# Patient Record
Sex: FEMALE | Race: WHITE | HISPANIC OR LATINO | Employment: UNEMPLOYED | URBAN - METROPOLITAN AREA
[De-identification: names, ages, dates, MRNs, and addresses within clinical notes are randomized per-mention and may not be internally consistent; named-entity substitution may affect disease eponyms.]

---
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
## 2021-02-05 ENCOUNTER — HOSPITAL ENCOUNTER (OUTPATIENT)
Facility: HOSPITAL | Age: 21
Discharge: HOME | End: 2021-02-05
Attending: ADVANCED PRACTICE MIDWIFE | Admitting: ADVANCED PRACTICE MIDWIFE

## 2021-02-05 ENCOUNTER — HOSPITAL ENCOUNTER (OUTPATIENT)
Facility: HOSPITAL | Age: 21
End: 2021-02-05
Attending: OBSTETRICS & GYNECOLOGY | Admitting: OBSTETRICS & GYNECOLOGY

## 2021-02-05 ENCOUNTER — HOSPITAL ENCOUNTER (EMERGENCY)
Facility: HOSPITAL | Age: 21
Discharge: STILL ADMITTED OR ADMINISTRATIVE | End: 2021-02-05

## 2021-02-05 VITALS
SYSTOLIC BLOOD PRESSURE: 123 MMHG | DIASTOLIC BLOOD PRESSURE: 63 MMHG | BODY MASS INDEX: 30.9 KG/M2 | WEIGHT: 181 LBS | RESPIRATION RATE: 16 BRPM | HEIGHT: 64 IN | OXYGEN SATURATION: 100 % | TEMPERATURE: 98.3 F | HEART RATE: 102 BPM

## 2021-02-05 LAB
BACTERIA UR QL AUTO: ABNORMAL /HPF
BILIRUB UR QL STRIP: NEGATIVE
CLARITY UR: ABNORMAL
COLOR UR: YELLOW
GLUCOSE UR STRIP-MCNC: NEGATIVE MG/DL
HGB UR QL STRIP.AUTO: NEGATIVE
HYALINE CASTS UR QL AUTO: ABNORMAL /LPF
KETONES UR QL STRIP: NEGATIVE
LEUKOCYTE ESTERASE UR QL STRIP.AUTO: ABNORMAL
NITRITE UR QL STRIP: NEGATIVE
PH UR STRIP.AUTO: 7.5 [PH] (ref 5–8)
PROT UR QL STRIP: NEGATIVE
RBC # UR: ABNORMAL /HPF
REF LAB TEST METHOD: ABNORMAL
SP GR UR STRIP: 1.01 (ref 1–1.03)
SQUAMOUS #/AREA URNS HPF: ABNORMAL /HPF
UROBILINOGEN UR QL STRIP: ABNORMAL
WBC UR QL AUTO: ABNORMAL /HPF

## 2021-02-05 PROCEDURE — 59025 FETAL NON-STRESS TEST: CPT | Performed by: OBSTETRICS & GYNECOLOGY

## 2021-02-05 PROCEDURE — 96372 THER/PROPH/DIAG INJ SC/IM: CPT

## 2021-02-05 PROCEDURE — 81001 URINALYSIS AUTO W/SCOPE: CPT | Performed by: OBSTETRICS & GYNECOLOGY

## 2021-02-05 PROCEDURE — 99204 OFFICE O/P NEW MOD 45 MIN: CPT | Performed by: OBSTETRICS & GYNECOLOGY

## 2021-02-05 PROCEDURE — G0463 HOSPITAL OUTPT CLINIC VISIT: HCPCS

## 2021-02-05 PROCEDURE — 25010000002 CEFTRIAXONE PER 250 MG: Performed by: OBSTETRICS & GYNECOLOGY

## 2021-02-05 RX ORDER — AMOXICILLIN 875 MG/1
875 TABLET, COATED ORAL 2 TIMES DAILY
COMMUNITY
Start: 2021-02-04 | End: 2021-02-05 | Stop reason: HOSPADM

## 2021-02-05 RX ORDER — NITROFURANTOIN 25; 75 MG/1; MG/1
100 CAPSULE ORAL 2 TIMES DAILY
COMMUNITY
Start: 2021-01-20 | End: 2021-02-05 | Stop reason: HOSPADM

## 2021-02-05 RX ADMIN — LIDOCAINE HYDROCHLORIDE 1 G: 10 INJECTION, SOLUTION EPIDURAL; INFILTRATION; INTRACAUDAL; PERINEURAL at 11:30

## 2021-02-05 NOTE — H&P
[facility]  Obstetric History and Physical    Chief Complaint   Patient presents with   • Back Pain     uti  tx abx; 2 uti, abx       Subjective     Patient is a 20 y.o. female  currently at 26w3d, who presents with complaints of low back pain.  She has recently been treated for UTI with nitrofurantoin.  She states she was seen at urgent treatment yesterday and given amoxicillin.  Review of her urine today does confirm 6-12 white blood cells per high-power field but there are also 3-6 epithelial cells noted.  Patient denies contractions, vaginal bleeding or leakage of fluid.    Her prenatal care is reportedly benign. Her previous obstetric/gynecological history is noncontributory.    The following portions of the patients history were reviewed and updated as appropriate: current medications, allergies, past medical history, past surgical history, past family history, past social history and problem list .        Prenatal Information:   Maternal Prenatal Labs  Blood Type No results found for: ABO   Rh Status No results found for: RH   Antibody Screen No results found for: ABSCRN   Gonnorhea No results found for: GCCX   Chlamydia No results found for: CLAMYDCU   RPR No results found for: RPR   Syphilis Antibody No results found for: SYPHILIS   Rubella No results found for: RUBELLAIGGIN   Hepatitis B Surface Antigen No results found for: HEPBSAG   HIV-1 Antibody No results found for: LABHIV1   Hepatitis C Antibody No results found for: HEPCAB   Rapid Urin Drug Screen No results found for: AMPMETHU, BARBITSCNUR, LABBENZSCN, LABMETHSCN, LABOPIASCN, THCURSCR, COCAINEUR, AMPHETSCREEN, PROPOXSCN, BUPRENORSCNU, METAMPSCNUR, OXYCODONESCN, TRICYCLICSCN   Group B Strep Culture No results found for: GBSANTIGEN           External Prenatal Results     Pregnancy Outside Results - Transcribed From Office Records - See Scanned Records For Details     Test Value Date Time    Hgb 11.7 g/dL 10/30/20 1105    Hct 35.1 %  10/30/20 1105    ABO A  20 1656    Rh Positive  20 1656    Antibody Screen Negative  20 1656    Glucose Fasting GTT       Glucose Tolerance Test 1 hour       Glucose Tolerance Test 3 hour       Gonorrhea (discrete)       Chlamydia (discrete)       RPR Non-Reactive  10/30/20 1105    VDRL       Syphilis Antibody       Rubella Positive  10/30/20 1105    HBsAg Non-Reactive  10/30/20 1105    Herpes Simplex Virus PCR       Herpes Simplex VIrus Culture       HIV Non-Reactive  10/30/20 1105    Hep C RNA Quant PCR       Hep C Antibody Non-Reactive  10/30/20 1105    AFP       Group B Strep       GBS Susceptibility to Clindamycin       GBS Susceptibility to Erythromycin       Fetal Fibronectin       Genetic Testing, Maternal Blood             Drug Screening     Test Value Date Time    Urine Drug Screen       Amphetamine Screen Negative  10/30/20 1105    Barbiturate Screen Negative  10/30/20 1105    Benzodiazepine Screen Negative  10/30/20 1105    Methadone Screen Negative  10/30/20 1105    Phencyclidine Screen Negative  10/30/20 1105    Opiates Screen Negative  10/30/20 1105    THC Screen Negative  10/30/20 1105    Cocaine Screen       Propoxyphene Screen Negative  10/30/20 1105    Buprenorphine Screen Negative  10/30/20 1105    Methamphetamine Screen       Oxycodone Screen Negative  10/30/20 1105    Tricyclic Antidepressants Screen Negative  10/30/20 1105                  Past OB History:       OB History    Para Term  AB Living   1 0 0 0 0 0   SAB TAB Ectopic Molar Multiple Live Births   0 0 0 0 0 0      # Outcome Date GA Lbr Abner/2nd Weight Sex Delivery Anes PTL Lv   1 Current                Past Medical History:  Past Medical History:   Diagnosis Date   • Urinary tract infection       Past Surgical History History reviewed. No pertinent surgical history.   Family History: Family History   Problem Relation Age of Onset   • No Known Problems Mother    • No Known Problems Father       Social  History:  reports that she has never smoked. She has never used smokeless tobacco.   reports no history of alcohol use.   reports no history of drug use.   Allergies: Patient has no known allergies.  Current Medications:          No current facility-administered medications on file prior to encounter.      Current Outpatient Medications on File Prior to Encounter   Medication Sig Dispense Refill   • amoxicillin (AMOXIL) 875 MG tablet Take 875 mg by mouth 2 (Two) Times a Day.     • nitrofurantoin, macrocrystal-monohydrate, (MACROBID) 100 MG capsule Take 100 mg by mouth 2 (Two) Times a Day.         General ROS: Pertinent items are noted in HPI, all other systems reviewed and negative    Objective       Vital Signs Range for the last 24 hours  Temperature: Temp:  [98.3 °F (36.8 °C)] 98.3 °F (36.8 °C)   Temp Source: Temp src: Oral   BP: BP: (123)/(63) 123/63   Pulse: Heart Rate:  [102] 102   Respirations: Resp:  [16] 16   SPO2: SpO2:  [100 %] 100 %   O2 Amount (l/min):     O2 Devices     Weight: Weight:  [82.1 kg (181 lb)] 82.1 kg (181 lb)     Physical Examination: General appearance - alert, well appearing, and in no distress, oriented to person, place, and time and normal appearing weight  Mental status - alert, oriented to person, place, and time, normal mood, behavior, speech, dress, motor activity, and thought processes  Eyes - pupils equal and reactive, extraocular eye movements intact, sclera anicteric  Neck - supple, no significant adenopathy, thyroid exam: thyroid is normal in size without nodules or tenderness  Chest - clear to auscultation, no wheezes, rales or rhonchi, symmetric air entry  Heart - normal rate, regular rhythm, normal S1, S2, no murmurs, rubs, clicks or gallops  Abdomen-soft, nontender, nondistended, no masses or organomegaly   Abdomen, Non-Tender  Pelvic - VULVA: normal appearing vulva with no masses, tenderness or lesions, CERVIX: Closed/thick/long  Neurological - alert, oriented,  "normal speech, no focal findings or movement disorder noted, DTR's normal and symmetric  Extremities - no pedal edema noted    Fetal Heart Rate Assessment  Indication: Back pain   Start Time: 0847                end Time: 1011   NST Results: NST reactive.  Baseline fetal heart rate 130-140 bpm.  Average variability with multiple 15 x 15 accelerations.  No decelerations.  No contractions        Laboratory Results:   No results found for: ALKPHOS, ALT, AST, CREATININE, BILITOT, LDH, URICACID    No results found for: WBC, RBC, HGB, HCT, MCV, MCH, MCHC, RDW, RDWSD, MPV, PLT, NEUTRORELPCT, LYMPHORELPCT, MONORELPCT, EOSRELPCT, BASORELPCT, AUTOIGPER, NEUTROABS, LYMPHSABS, MONOSABS, EOSABS, BASOSABS, AUTOIGNUM, NRBC          Brief Urine Lab Results  (Last result in the past 365 days)      Color   Clarity   Blood   Leuk Est   Nitrite   Protein   CREAT   Urine HCG        21 0907 Yellow Turbid Negative Trace Negative Negative                 Radiology Review: No new studies  Other Studies: Equivocal UA.    Assessment/Plan       * No active hospital problems. *        Assessment:  1. Back pain in pregnancy.  2. UTI in pregnancy    Plan:  1. Rocephin 1 g IM.  2. Reviewed signs/symptoms of  labor.  3. Discharged home.  4. Keep regularly scheduled OB office visit.     Total time spent today with Shirley  was 20-29 minutes (level 3).  Of this time, > 50% was spent face-to-face time coordinating care, answering her questions and counseling regarding pathophysiology of her presenting problem along with plans for any diagnostic work-up and treatment.        Saturnino Ramos \"Angi\" KWAME Kearney MD  2021  10:22 EST    "

## 2021-02-10 ENCOUNTER — LAB (OUTPATIENT)
Dept: LAB | Facility: HOSPITAL | Age: 21
End: 2021-02-10

## 2021-02-10 ENCOUNTER — TRANSCRIBE ORDERS (OUTPATIENT)
Dept: LAB | Facility: HOSPITAL | Age: 21
End: 2021-02-10

## 2021-02-10 DIAGNOSIS — Z34.83 PRENATAL CARE, SUBSEQUENT PREGNANCY, THIRD TRIMESTER: Primary | ICD-10-CM

## 2021-02-10 DIAGNOSIS — Z3A.27 27 WEEKS GESTATION OF PREGNANCY: ICD-10-CM

## 2021-02-10 DIAGNOSIS — Z34.83 PRENATAL CARE, SUBSEQUENT PREGNANCY, THIRD TRIMESTER: ICD-10-CM

## 2021-02-10 LAB
BLD GP AB SCN SERPL QL: NEGATIVE
DEPRECATED RDW RBC AUTO: 39.7 FL (ref 37–54)
ERYTHROCYTE [DISTWIDTH] IN BLOOD BY AUTOMATED COUNT: 12.7 % (ref 12.3–15.4)
GLUCOSE 1H P 100 G GLC PO SERPL-MCNC: 121 MG/DL (ref 65–140)
HCT VFR BLD AUTO: 33.6 % (ref 34–46.6)
HGB BLD-MCNC: 10.5 G/DL (ref 12–15.9)
MCH RBC QN AUTO: 26.8 PG (ref 26.6–33)
MCHC RBC AUTO-ENTMCNC: 31.3 G/DL (ref 31.5–35.7)
MCV RBC AUTO: 85.7 FL (ref 79–97)
PLATELET # BLD AUTO: 328 10*3/MM3 (ref 140–450)
PMV BLD AUTO: 9.8 FL (ref 6–12)
RBC # BLD AUTO: 3.92 10*6/MM3 (ref 3.77–5.28)
WBC # BLD AUTO: 13.49 10*3/MM3 (ref 3.4–10.8)

## 2021-02-10 PROCEDURE — 86850 RBC ANTIBODY SCREEN: CPT

## 2021-02-10 PROCEDURE — 85027 COMPLETE CBC AUTOMATED: CPT

## 2021-02-10 PROCEDURE — 82950 GLUCOSE TEST: CPT

## 2021-02-10 PROCEDURE — 36415 COLL VENOUS BLD VENIPUNCTURE: CPT

## 2021-03-09 ENCOUNTER — HOSPITAL ENCOUNTER (OUTPATIENT)
Facility: HOSPITAL | Age: 21
Setting detail: OBSERVATION
Discharge: HOME | End: 2021-03-10
Attending: ADVANCED PRACTICE MIDWIFE | Admitting: OBSTETRICS & GYNECOLOGY

## 2021-03-09 LAB
DEPRECATED RDW RBC AUTO: 40.8 FL (ref 37–54)
ERYTHROCYTE [DISTWIDTH] IN BLOOD BY AUTOMATED COUNT: 13.8 % (ref 12.3–15.4)
HCT VFR BLD AUTO: 29.9 % (ref 34–46.6)
HGB BLD-MCNC: 9.5 G/DL (ref 12–15.9)
MCH RBC QN AUTO: 26.5 PG (ref 26.6–33)
MCHC RBC AUTO-ENTMCNC: 31.8 G/DL (ref 31.5–35.7)
MCV RBC AUTO: 83.3 FL (ref 79–97)
PLATELET # BLD AUTO: 289 10*3/MM3 (ref 140–450)
PMV BLD AUTO: 9.6 FL (ref 6–12)
RBC # BLD AUTO: 3.59 10*6/MM3 (ref 3.77–5.28)
WBC # BLD AUTO: 9.88 10*3/MM3 (ref 3.4–10.8)

## 2021-03-09 PROCEDURE — 36415 COLL VENOUS BLD VENIPUNCTURE: CPT | Performed by: OBSTETRICS & GYNECOLOGY

## 2021-03-09 PROCEDURE — 87086 URINE CULTURE/COLONY COUNT: CPT | Performed by: OBSTETRICS & GYNECOLOGY

## 2021-03-09 PROCEDURE — 85027 COMPLETE CBC AUTOMATED: CPT | Performed by: OBSTETRICS & GYNECOLOGY

## 2021-03-09 PROCEDURE — G0378 HOSPITAL OBSERVATION PER HR: HCPCS

## 2021-03-09 PROCEDURE — 81001 URINALYSIS AUTO W/SCOPE: CPT | Performed by: OBSTETRICS & GYNECOLOGY

## 2021-03-09 PROCEDURE — 80053 COMPREHEN METABOLIC PANEL: CPT | Performed by: OBSTETRICS & GYNECOLOGY

## 2021-03-09 RX ORDER — D-METHORPHAN/PE/ACETAMINOPHEN 10-5-325MG
2 CAPSULE ORAL
COMMUNITY

## 2021-03-09 RX ORDER — FERROUS SULFATE 325(65) MG
325 TABLET ORAL
COMMUNITY

## 2021-03-10 ENCOUNTER — HOSPITAL ENCOUNTER (OUTPATIENT)
Facility: HOSPITAL | Age: 21
End: 2021-03-10
Attending: OBSTETRICS & GYNECOLOGY | Admitting: OBSTETRICS & GYNECOLOGY

## 2021-03-10 VITALS
WEIGHT: 189 LBS | TEMPERATURE: 98.8 F | SYSTOLIC BLOOD PRESSURE: 129 MMHG | DIASTOLIC BLOOD PRESSURE: 78 MMHG | HEIGHT: 64 IN | RESPIRATION RATE: 16 BRPM | BODY MASS INDEX: 32.27 KG/M2

## 2021-03-10 PROBLEM — O26.813 FATIGUE DURING PREGNANCY IN THIRD TRIMESTER: Status: ACTIVE | Noted: 2021-03-10

## 2021-03-10 LAB
ALBUMIN SERPL-MCNC: 3.3 G/DL (ref 3.5–5.2)
ALBUMIN/GLOB SERPL: 1.2 G/DL
ALP SERPL-CCNC: 88 U/L (ref 39–117)
ALT SERPL W P-5'-P-CCNC: 11 U/L (ref 1–33)
AMORPH URATE CRY URNS QL MICRO: ABNORMAL /HPF
ANION GAP SERPL CALCULATED.3IONS-SCNC: 11 MMOL/L (ref 5–15)
AST SERPL-CCNC: 17 U/L (ref 1–32)
BACTERIA UR QL AUTO: ABNORMAL /HPF
BACTERIA UR QL AUTO: ABNORMAL /HPF
BILIRUB SERPL-MCNC: <0.2 MG/DL (ref 0–1.2)
BILIRUB UR QL STRIP: NEGATIVE
BILIRUB UR QL STRIP: NEGATIVE
BUN SERPL-MCNC: 6 MG/DL (ref 6–20)
BUN/CREAT SERPL: 11.8 (ref 7–25)
CALCIUM SPEC-SCNC: 8.6 MG/DL (ref 8.6–10.5)
CHLORIDE SERPL-SCNC: 106 MMOL/L (ref 98–107)
CLARITY UR: ABNORMAL
CLARITY UR: ABNORMAL
CO2 SERPL-SCNC: 22 MMOL/L (ref 22–29)
COD CRY URNS QL: ABNORMAL /HPF
COLOR UR: YELLOW
COLOR UR: YELLOW
CREAT SERPL-MCNC: 0.51 MG/DL (ref 0.57–1)
GFR SERPL CREATININE-BSD FRML MDRD: >150 ML/MIN/1.73
GFR SERPL CREATININE-BSD FRML MDRD: >150 ML/MIN/1.73
GLOBULIN UR ELPH-MCNC: 2.8 GM/DL
GLUCOSE SERPL-MCNC: 174 MG/DL (ref 65–99)
GLUCOSE UR STRIP-MCNC: ABNORMAL MG/DL
GLUCOSE UR STRIP-MCNC: ABNORMAL MG/DL
HGB UR QL STRIP.AUTO: NEGATIVE
HGB UR QL STRIP.AUTO: NEGATIVE
HYALINE CASTS UR QL AUTO: ABNORMAL /LPF
HYALINE CASTS UR QL AUTO: ABNORMAL /LPF
KETONES UR QL STRIP: ABNORMAL
KETONES UR QL STRIP: NEGATIVE
LEUKOCYTE ESTERASE UR QL STRIP.AUTO: ABNORMAL
LEUKOCYTE ESTERASE UR QL STRIP.AUTO: NEGATIVE
NITRITE UR QL STRIP: NEGATIVE
NITRITE UR QL STRIP: NEGATIVE
PH UR STRIP.AUTO: 6.5 [PH] (ref 5–8)
PH UR STRIP.AUTO: 6.5 [PH] (ref 5–8)
POTASSIUM SERPL-SCNC: 3.4 MMOL/L (ref 3.5–5.2)
PROT SERPL-MCNC: 6.1 G/DL (ref 6–8.5)
PROT UR QL STRIP: ABNORMAL
PROT UR QL STRIP: ABNORMAL
RBC # UR: ABNORMAL /HPF
RBC # UR: ABNORMAL /HPF
REF LAB TEST METHOD: ABNORMAL
REF LAB TEST METHOD: ABNORMAL
RENAL EPI CELLS #/AREA URNS HPF: ABNORMAL /HPF
RENAL EPI CELLS #/AREA URNS HPF: ABNORMAL /HPF
SODIUM SERPL-SCNC: 139 MMOL/L (ref 136–145)
SP GR UR STRIP: 1.02 (ref 1–1.03)
SP GR UR STRIP: 1.03 (ref 1–1.03)
SQUAMOUS #/AREA URNS HPF: ABNORMAL /HPF
SQUAMOUS #/AREA URNS HPF: ABNORMAL /HPF
TRANS CELLS #/AREA URNS HPF: ABNORMAL /HPF
UROBILINOGEN UR QL STRIP: ABNORMAL
UROBILINOGEN UR QL STRIP: ABNORMAL
WBC UR QL AUTO: ABNORMAL /HPF
WBC UR QL AUTO: ABNORMAL /HPF

## 2021-03-10 PROCEDURE — 99219 PR INITIAL OBSERVATION CARE/DAY 50 MINUTES: CPT | Performed by: OBSTETRICS & GYNECOLOGY

## 2021-03-10 PROCEDURE — G0378 HOSPITAL OBSERVATION PER HR: HCPCS

## 2021-03-10 PROCEDURE — 59025 FETAL NON-STRESS TEST: CPT | Performed by: OBSTETRICS & GYNECOLOGY

## 2021-03-10 PROCEDURE — 87086 URINE CULTURE/COLONY COUNT: CPT | Performed by: OBSTETRICS & GYNECOLOGY

## 2021-03-10 PROCEDURE — 81001 URINALYSIS AUTO W/SCOPE: CPT | Performed by: OBSTETRICS & GYNECOLOGY

## 2021-03-10 PROCEDURE — P9612 CATHETERIZE FOR URINE SPEC: HCPCS

## 2021-03-10 PROCEDURE — 59025 FETAL NON-STRESS TEST: CPT

## 2021-03-10 NOTE — H&P
"[place]  2000  [id number]  [id number]    CC: feels weak  HPI:  Patient is 20 y.o. female   currently at 31w1d presents with c/o feeling weak and exhausted.  Today only.  Pt also with c/o low back pain.  Good FM.  Pt denies fever, bleeding, SROM, or contractions.  Pt further denies N, V, or D.  Appetite is poor, but she is eating and drinking.  Pt says she has gained ~20 pounds so far this preg.     PMH:  Current meds: flinstones, OTC Fe  Illnesses: asthma (resolved), anxiety  Surgeries: none  Allergies: NKDA    Past OB History:       OB History    Para Term  AB Living   1 0 0 0 0 0   SAB TAB Ectopic Molar Multiple Live Births   0 0 0 0 0 0      # Outcome Date GA Lbr Abner/2nd Weight Sex Delivery Anes PTL Lv   1 Current                     SH: tob neg , EtOH neg, drugs neg  FH: heart dz neg , diabetes pos , cancer pos    General ROS: fatigue.   All other systems reviewed and are negative.      Physical Examination: General appearance - alert, well appearing, and in no distress  Vital signs - /78   Temp 98.8 °F (37.1 °C) (Oral)   Resp 16   Ht 162.6 cm (64\")   Wt 85.7 kg (189 lb)   BMI 32.44 kg/m²   HEENT: normocephalic, atraumatic,oropharynx clear, appearance of ears and nose normal  Neck - supple, no significant adenopathy, no thyromegaly  Lymphatics - no palpable lymphadenopathy in the neck or groin, no hepatosplenomegaly  Chest - clear to auscultation, no wheezes, rales or rhonchi, respiratory effort non-labored  Heart - normal rate, regular rhythm, no murmurs, rubs, clicks or gallops, no JVD, 1+ lower extremity edema  Abdomen - soft, nontender, nondistended, no masses, no hepatosplenomegaly  no rebound tenderness noted, bowel sounds normal  Vaginal Exam: deferred ,external genitalia normal  Extremities - 1+ pedal edema noted, no calf tend  Skin -warm and dry, normal coloration and turgor, no rashes, no suspicious skin lesions noted      Fetal monitoring: indication " weakness , onset 2315 , offset 0028 , baseline 135-145 , mod BTB variability , multiple accels (15 X 15), no decels, no contractions, interpretation reactive NST    Radiology     Assessment 1)IUP 31 1/7 weeks   2)fatigue- pt with mild anemia, exam unrevealing    Plan 1)observe     2)cath UA (ccUA contam)    3)home after labs    4)keep next sched appt    Shashank Regalado MD  3/10/2021  00:26 EST

## 2021-03-11 LAB
BACTERIA SPEC AEROBE CULT: NO GROWTH
BACTERIA SPEC AEROBE CULT: NORMAL

## 2021-04-01 ENCOUNTER — LAB (OUTPATIENT)
Dept: LAB | Facility: HOSPITAL | Age: 21
End: 2021-04-01

## 2021-04-01 ENCOUNTER — TRANSCRIBE ORDERS (OUTPATIENT)
Dept: LAB | Facility: HOSPITAL | Age: 21
End: 2021-04-01

## 2021-04-01 DIAGNOSIS — O12.03 GESTATIONAL EDEMA IN THIRD TRIMESTER: Primary | ICD-10-CM

## 2021-04-01 DIAGNOSIS — O12.03 GESTATIONAL EDEMA IN THIRD TRIMESTER: ICD-10-CM

## 2021-04-01 LAB
ALP SERPL-CCNC: 117 U/L (ref 39–117)
ALT SERPL W P-5'-P-CCNC: 7 U/L (ref 1–33)
AST SERPL-CCNC: 16 U/L (ref 1–32)
BILIRUB SERPL-MCNC: 0.2 MG/DL (ref 0–1.2)
CREAT SERPL-MCNC: 0.58 MG/DL (ref 0.57–1)
DEPRECATED RDW RBC AUTO: 47.7 FL (ref 37–54)
ERYTHROCYTE [DISTWIDTH] IN BLOOD BY AUTOMATED COUNT: 15.3 % (ref 12.3–15.4)
HCT VFR BLD AUTO: 35.6 % (ref 34–46.6)
HGB BLD-MCNC: 11.3 G/DL (ref 12–15.9)
LDH SERPL-CCNC: 181 U/L (ref 135–214)
MCH RBC QN AUTO: 27 PG (ref 26.6–33)
MCHC RBC AUTO-ENTMCNC: 31.7 G/DL (ref 31.5–35.7)
MCV RBC AUTO: 85.2 FL (ref 79–97)
PLATELET # BLD AUTO: 291 10*3/MM3 (ref 140–450)
PMV BLD AUTO: 10.3 FL (ref 6–12)
RBC # BLD AUTO: 4.18 10*6/MM3 (ref 3.77–5.28)
URATE SERPL-MCNC: 4.6 MG/DL (ref 2.4–5.7)
WBC # BLD AUTO: 8.53 10*3/MM3 (ref 3.4–10.8)

## 2021-04-01 PROCEDURE — 84550 ASSAY OF BLOOD/URIC ACID: CPT

## 2021-04-01 PROCEDURE — 82570 ASSAY OF URINE CREATININE: CPT

## 2021-04-01 PROCEDURE — 83615 LACTATE (LD) (LDH) ENZYME: CPT

## 2021-04-01 PROCEDURE — 84075 ASSAY ALKALINE PHOSPHATASE: CPT

## 2021-04-01 PROCEDURE — 84156 ASSAY OF PROTEIN URINE: CPT

## 2021-04-01 PROCEDURE — 36415 COLL VENOUS BLD VENIPUNCTURE: CPT

## 2021-04-01 PROCEDURE — 84450 TRANSFERASE (AST) (SGOT): CPT

## 2021-04-01 PROCEDURE — 84460 ALANINE AMINO (ALT) (SGPT): CPT

## 2021-04-01 PROCEDURE — 82565 ASSAY OF CREATININE: CPT

## 2021-04-01 PROCEDURE — 82247 BILIRUBIN TOTAL: CPT

## 2021-04-01 PROCEDURE — 85027 COMPLETE CBC AUTOMATED: CPT

## 2021-04-02 LAB
CREAT UR-MCNC: 90.2 MG/DL
CREAT UR-MCNC: 90.2 MG/DL
PROT UR-MCNC: 19 MG/DL
PROT UR-MCNC: 19 MG/DL
PROT/CREAT UR: 210.6 MG/G CREA (ref 0–200)

## 2021-04-11 ENCOUNTER — HOSPITAL ENCOUNTER (INPATIENT)
Facility: HOSPITAL | Age: 21
LOS: 4 days | Discharge: HOME | End: 2021-04-17
Attending: OBSTETRICS & GYNECOLOGY | Admitting: OBSTETRICS & GYNECOLOGY

## 2021-04-11 ENCOUNTER — HOSPITAL ENCOUNTER (OUTPATIENT)
Facility: HOSPITAL | Age: 21
Discharge: HOME | End: 2021-04-11
Attending: ADVANCED PRACTICE MIDWIFE | Admitting: OBSTETRICS & GYNECOLOGY

## 2021-04-11 ENCOUNTER — HOSPITAL ENCOUNTER (OUTPATIENT)
Dept: LABOR AND DELIVERY | Facility: HOSPITAL | Age: 21
Discharge: HOME | End: 2021-04-11

## 2021-04-11 VITALS
DIASTOLIC BLOOD PRESSURE: 97 MMHG | HEART RATE: 79 BPM | RESPIRATION RATE: 18 BRPM | BODY MASS INDEX: 38.41 KG/M2 | HEIGHT: 64 IN | TEMPERATURE: 98.1 F | SYSTOLIC BLOOD PRESSURE: 146 MMHG | WEIGHT: 225 LBS

## 2021-04-11 PROBLEM — O13.9 GESTATIONAL HTN: Status: ACTIVE | Noted: 2021-04-11

## 2021-04-11 LAB
ALP SERPL-CCNC: 120 U/L (ref 39–117)
ALT SERPL W P-5'-P-CCNC: 6 U/L (ref 1–33)
AST SERPL-CCNC: 16 U/L (ref 1–32)
BILIRUB SERPL-MCNC: 0.2 MG/DL (ref 0–1.2)
CREAT SERPL-MCNC: 0.61 MG/DL (ref 0.57–1)
DEPRECATED RDW RBC AUTO: 48.5 FL (ref 37–54)
ERYTHROCYTE [DISTWIDTH] IN BLOOD BY AUTOMATED COUNT: 15.9 % (ref 12.3–15.4)
EXPIRATION DATE: 0
EXPIRATION DATE: ABNORMAL
HCT VFR BLD AUTO: 34.2 % (ref 34–46.6)
HGB BLD-MCNC: 10.9 G/DL (ref 12–15.9)
LDH SERPL-CCNC: 179 U/L (ref 135–214)
Lab: 0
Lab: 3022
MCH RBC QN AUTO: 26.7 PG (ref 26.6–33)
MCHC RBC AUTO-ENTMCNC: 31.9 G/DL (ref 31.5–35.7)
MCV RBC AUTO: 83.6 FL (ref 79–97)
PLATELET # BLD AUTO: 266 10*3/MM3 (ref 140–450)
PMV BLD AUTO: 10.3 FL (ref 6–12)
PROT UR STRIP-MCNC: ABNORMAL MG/DL
PROT UR STRIP-MCNC: ABNORMAL MG/DL
RBC # BLD AUTO: 4.09 10*6/MM3 (ref 3.77–5.28)
SARS-COV-2 RDRP RESP QL NAA+PROBE: NORMAL
URATE SERPL-MCNC: 5.8 MG/DL (ref 2.4–5.7)
WBC # BLD AUTO: 8.97 10*3/MM3 (ref 3.4–10.8)

## 2021-04-11 PROCEDURE — 84450 TRANSFERASE (AST) (SGOT): CPT | Performed by: OBSTETRICS & GYNECOLOGY

## 2021-04-11 PROCEDURE — 82565 ASSAY OF CREATININE: CPT | Performed by: OBSTETRICS & GYNECOLOGY

## 2021-04-11 PROCEDURE — 59025 FETAL NON-STRESS TEST: CPT

## 2021-04-11 PROCEDURE — 99214 OFFICE O/P EST MOD 30 MIN: CPT | Performed by: OBSTETRICS & GYNECOLOGY

## 2021-04-11 PROCEDURE — 84460 ALANINE AMINO (ALT) (SGPT): CPT | Performed by: OBSTETRICS & GYNECOLOGY

## 2021-04-11 PROCEDURE — 36415 COLL VENOUS BLD VENIPUNCTURE: CPT | Performed by: OBSTETRICS & GYNECOLOGY

## 2021-04-11 PROCEDURE — 84075 ASSAY ALKALINE PHOSPHATASE: CPT | Performed by: OBSTETRICS & GYNECOLOGY

## 2021-04-11 PROCEDURE — 81002 URINALYSIS NONAUTO W/O SCOPE: CPT | Performed by: OBSTETRICS & GYNECOLOGY

## 2021-04-11 PROCEDURE — S0260 H&P FOR SURGERY: HCPCS | Performed by: OBSTETRICS & GYNECOLOGY

## 2021-04-11 PROCEDURE — 83615 LACTATE (LD) (LDH) ENZYME: CPT | Performed by: OBSTETRICS & GYNECOLOGY

## 2021-04-11 PROCEDURE — 85027 COMPLETE CBC AUTOMATED: CPT | Performed by: OBSTETRICS & GYNECOLOGY

## 2021-04-11 PROCEDURE — 82247 BILIRUBIN TOTAL: CPT | Performed by: OBSTETRICS & GYNECOLOGY

## 2021-04-11 PROCEDURE — 84550 ASSAY OF BLOOD/URIC ACID: CPT | Performed by: OBSTETRICS & GYNECOLOGY

## 2021-04-11 PROCEDURE — 59025 FETAL NON-STRESS TEST: CPT | Performed by: OBSTETRICS & GYNECOLOGY

## 2021-04-11 PROCEDURE — G0463 HOSPITAL OUTPT CLINIC VISIT: HCPCS

## 2021-04-11 PROCEDURE — 87635 SARS-COV-2 COVID-19 AMP PRB: CPT | Performed by: OBSTETRICS & GYNECOLOGY

## 2021-04-11 NOTE — H&P
[facility]  Obstetric History and Physical    Referring Provider: Kyara Lino MD      Chief Complaint   Patient presents with   • Elevated Blood Pressure       Subjective     Patient is a 20 y.o. female  currently at 35w5d, who presents for follow-up blood pressure.  Patient was seen last evening after not feeling well with elevated blood pressure.  Labs were drawn which were normal and significant proteinuria.  She was sent home with a 24-hour urine protein collection. patient denies any current leaking of fluid, vaginal bleeding, persistent headache, nausea, vomiting, or other associated symptoms or concern.  Patient reports normal fetal activity..      .    The following portions of the patients history were reviewed and updated as appropriate: current medications, allergies, past medical history, past surgical history, past family history, past social history and problem list .       Prenatal Information:   Maternal Prenatal Labs  Blood Type No results found for: ABO   Rh Status No results found for: RH   Antibody Screen No results found for: ABSCRN   Gonnorhea No results found for: GCCX   Chlamydia No results found for: CLAMYDCU   RPR No results found for: RPR   Syphilis Antibody No results found for: SYPHILIS   Rubella No results found for: RUBELLAIGGIN   Hepatitis B Surface Antigen No results found for: HEPBSAG   HIV-1 Antibody No results found for: LABHIV1   Hepatitis C Antibody No results found for: HEPCAB   Rapid Urin Drug Screen No results found for: AMPMETHU, BARBITSCNUR, LABBENZSCN, LABMETHSCN, LABOPIASCN, THCURSCR, COCAINEUR, AMPHETSCREEN, PROPOXSCN, BUPRENORSCNU, METAMPSCNUR, OXYCODONESCN, TRICYCLICSCN   Group B Strep Culture No results found for: GBSANTIGEN           External Prenatal Results     Pregnancy Outside Results - Transcribed From Office Records - See Scanned Records For Details     Test Value Date Time    Hgb  10.9 g/dL 21 0051       11.3 g/dL 21 1503       9.5  g/dL 21 2327       10.5 g/dL 02/10/21 1108       11.7 g/dL 10/30/20 1105    Hct  34.2 % 21 0051       35.6 % 21 1503       29.9 % 21 2327       33.6 % 02/10/21 1108       35.1 % 10/30/20 1105    ABO  A  20 1656    Rh  Positive  20 1656    Antibody Screen  Negative  02/10/21 1108       Negative  20 1656    Glucose Fasting GTT       Glucose Tolerance Test 1 hour       Glucose Tolerance Test 3 hour       Gonorrhea (discrete)       Chlamydia (discrete)       RPR  Non-Reactive  10/30/20 1105    VDRL       Syphilis Antibody       Rubella  Positive  10/30/20 1105    HBsAg  Non-Reactive  10/30/20 1105    Herpes Simplex Virus PCR       Herpes Simplex VIrus Culture       HIV  Non-Reactive  10/30/20 1105    Hep C RNA Quant PCR       Hep C Antibody  Non-Reactive  10/30/20 1105    AFP       Group B Strep       GBS Susceptibility to Clindamycin       GBS Susceptibility to Erythromycin       Fetal Fibronectin       Genetic Testing, Maternal Blood             Drug Screening     Test Value Date Time    Urine Drug Screen       Amphetamine Screen  Negative  10/30/20 1105    Barbiturate Screen  Negative  10/30/20 1105    Benzodiazepine Screen  Negative  10/30/20 1105    Methadone Screen  Negative  10/30/20 1105    Phencyclidine Screen  Negative  10/30/20 1105    Opiates Screen  Negative  10/30/20 1105    THC Screen  Negative  10/30/20 1105    Cocaine Screen       Propoxyphene Screen  Negative  10/30/20 1105    Buprenorphine Screen  Negative  10/30/20 1105    Methamphetamine Screen       Oxycodone Screen  Negative  10/30/20 1105    Tricyclic Antidepressants Screen  Negative  10/30/20 1105          Legend    ^: Historical                          Past OB History:       OB History    Para Term  AB Living   1 0 0 0 0 0   SAB TAB Ectopic Molar Multiple Live Births   0 0 0 0 0 0      # Outcome Date GA Lbr Abner/2nd Weight Sex Delivery Anes PTL Lv   1 Current                Past Medical  History: Past Medical History:   Diagnosis Date   • Asthma    • Seasonal allergies    • Urinary tract infection       Past Surgical History History reviewed. No pertinent surgical history.   Family History: Family History   Problem Relation Age of Onset   • No Known Problems Mother    • No Known Problems Father       Social History:  reports that she has never smoked. She has never used smokeless tobacco.   reports no history of alcohol use.   reports no history of drug use.                   General ROS Negative Findings:Headaches, Visual Changes, Epigastric pain, Anorexia, Nausia/Vomiting, ROM and Vaginal Bleeding    ROS     All other systems have been reviewed and are neg  Objective       Vital Signs Range for the last 24 hours  Temperature: Temp:  [98 °F (36.7 °C)-98.1 °F (36.7 °C)] 98 °F (36.7 °C)   Temp Source: Temp src: Oral   BP: BP: (143-152)/(86-99) 152/99   Pulse: Heart Rate:  [79] 79   Respirations: Resp:  [18] 18   SPO2:     O2 Amount (l/min):     O2 Devices     Weight: Weight:  [102 kg (225 lb)] 102 kg (225 lb)     Physical Examination:   General:   alert, appears stated age and cooperative   Skin:   normal   HEENT:  Clear clear   Lungs:      Heart:      Gastrointestinal:  Abdomen soft, gravid uterus, guarding, rebound benign exam   Lower Extremities:  3+ edema no calf tenderness   : Exam deferred.   Musculoskeletal:     Neuro:  No focal deficit, DTR 2+4 no clonus           Fetal Heart Rate Assessment   Method: Fetal HR Assessment Method: external   Beats/min: Fetal HR (beats/min): 140   Baseline: Fetal Heart Baseline Rate: normal range   Varibility: Fetal HR Variability: moderate (amplitude range 6 to 25 bpm)   Accels: Fetal HR Accelerations: greater than/equal to 15 bpm, lasting at least 15 seconds   Decels: Fetal HR Decelerations: absent   Tracing Category:       Uterine Assessment   Method: Method: external tocotransducer   Frequency (min):     Ctx Count in 10 min:     Duration:     Intensity:  Contraction Intensity: no contractions   Intensity by IUPC:     Resting Tone: Uterine Resting Tone: soft by palpation   Resting Tone by IUPC:     [place] Units:       Laboratory Results:   Lab Results (last 24 hours)     ** No results found for the last 24 hours. **        Radiology Review:   Imaging Results (Last 24 Hours)     ** No results found for the last 24 hours. **        Other Studies:    Assessment/Plan       Gestational HTN        Assessment:  1.  Intrauterine pregnancy at 35w5d weeks gestation with reactive fetal status.    2.  Preeclampsia without severe features  3.  24 urine protein in process  4.  Obesity with BMI 38.60    Plan:  1.  Observation overnight, complete 24 urine protein, labs in a.m., if greater than 2 g of protein continue inpatient management until 37 weeks gestation or severe features develop.  PDC ultrasound tomorrow if patient continues inpatient management  2. Plan of care has been reviewed with patient.  3.  Risks, benefits of treatment plan have been discussed.  4.  All questions have been answered.  5 discussed with Robyn Valiente CNM.      Aydin Workman DO  4/11/2021  18:44 EDT

## 2021-04-11 NOTE — DISCHARGE INSTRUCTIONS
Patient is to complete a 24 hour urine at home and is to come back to the hospital this afternoon for a blood pressure check.  If blood pressure is normal this afternoon patient needs to call the office Monday morning for an appointment to be seen.

## 2021-04-11 NOTE — H&P
"[facility]  Obstetric History and Physical    Chief Complaint   Patient presents with   • Elevated Blood Pressure     high at home tonight       HPI:    Patient is a 20 y.o. female  currently at 35w5d, who presents with \"just feeling bad\" so she decided she should check her blood pressure.   It was 160/112.  She had elevated blood pressures in the office over the last 2 weeks and because of this, was scheduled for a growth USN on Thursday.   She denies headache, vision changes and RUQ pain.   She has some mild irregular contractions, +FM.  No vaginal leaking ir bleeding.       The following portions of the patients history were reviewed and updated as appropriate: current medications, allergies, past medical history, past surgical history, past family history, past social history and problem list .       Prenatal Information:   Maternal Prenatal Labs  Blood Type No results found for: ABO   Rh Status No results found for: RH   Antibody Screen No results found for: ABSCRN   Gonnorhea No results found for: GCCX   Chlamydia No results found for: CLAMYDCU   RPR No results found for: RPR   Syphilis Antibody No results found for: SYPHILIS   Rubella No results found for: RUBELLAIGGIN   Hepatitis B Surface Antigen No results found for: HEPBSAG   HIV-1 Antibody No results found for: LABHIV1   Hepatitis C Antibody No results found for: HEPCAB   Rapid Urin Drug Screen No results found for: AMPMETHU, BARBITSCNUR, LABBENZSCN, LABMETHSCN, LABOPIASCN, THCURSCR, COCAINEUR, AMPHETSCREEN, PROPOXSCN, BUPRENORSCNU, METAMPSCNUR, OXYCODONESCN, TRICYCLICSCN   Group B Strep Culture No results found for: GBSANTIGEN           External Prenatal Results     Pregnancy Outside Results - Transcribed From Office Records - See Scanned Records For Details     Test Value Date Time    Hgb  10.9 g/dL 21 0051       11.3 g/dL 21 1503       9.5 g/dL 21 2327       10.5 g/dL 02/10/21 1108       11.7 g/dL 10/30/20 1105    Hct  34.2 " % 21 0051       35.6 % 21 1503       29.9 % 21 2327       33.6 % 02/10/21 1108       35.1 % 10/30/20 1105    ABO  A  20 1656    Rh  Positive  20 1656    Antibody Screen  Negative  02/10/21 1108       Negative  20 1656    Glucose Fasting GTT       Glucose Tolerance Test 1 hour       Glucose Tolerance Test 3 hour       Gonorrhea (discrete)       Chlamydia (discrete)       RPR  Non-Reactive  10/30/20 1105    VDRL       Syphilis Antibody       Rubella  Positive  10/30/20 1105    HBsAg  Non-Reactive  10/30/20 1105    Herpes Simplex Virus PCR       Herpes Simplex VIrus Culture       HIV  Non-Reactive  10/30/20 1105    Hep C RNA Quant PCR       Hep C Antibody  Non-Reactive  10/30/20 1105    AFP       Group B Strep       GBS Susceptibility to Clindamycin       GBS Susceptibility to Erythromycin       Fetal Fibronectin       Genetic Testing, Maternal Blood             Drug Screening     Test Value Date Time    Urine Drug Screen       Amphetamine Screen  Negative  10/30/20 1105    Barbiturate Screen  Negative  10/30/20 1105    Benzodiazepine Screen  Negative  10/30/20 1105    Methadone Screen  Negative  10/30/20 1105    Phencyclidine Screen  Negative  10/30/20 1105    Opiates Screen  Negative  10/30/20 1105    THC Screen  Negative  10/30/20 1105    Cocaine Screen       Propoxyphene Screen  Negative  10/30/20 1105    Buprenorphine Screen  Negative  10/30/20 1105    Methamphetamine Screen       Oxycodone Screen  Negative  10/30/20 1105    Tricyclic Antidepressants Screen  Negative  10/30/20 1105          Legend    ^: Historical                          Past OB History:     OB History    Para Term  AB Living   1 0 0 0 0 0   SAB TAB Ectopic Molar Multiple Live Births   0 0 0 0 0 0      # Outcome Date GA Lbr Abner/2nd Weight Sex Delivery Anes PTL Lv   1 Current                Past Medical History: Past Medical History:   Diagnosis Date   • Asthma    • Seasonal allergies    • Urinary  tract infection       Past Surgical History No past surgical history on file.   Family History: Family History   Problem Relation Age of Onset   • No Known Problems Mother    • No Known Problems Father       Social History:  reports that she has never smoked. She has never used smokeless tobacco.   reports no history of alcohol use.   reports no history of drug use.        Review of Systems  Denies fever, CP, Shortness of air, muscle weakness,                                             and rashes      Objective     Vital Signs Range for the last 24 hours  Temperature: Temp:  [98.1 °F (36.7 °C)] 98.1 °F (36.7 °C)   Temp Source: Temp src: Oral   BP: BP: (143-149)/(86-97) 146/97   Pulse: Heart Rate:  [79] 79   Respirations: Resp:  [18] 18   SPO2:     O2 Amount (l/min):     O2 Devices     Weight: Weight:  [102 kg (225 lb)] 102 kg (225 lb)     Physical Examination: General appearance - alert, appears uncomfortable  and in no distress and oriented to person, place, and time  Chest - clear to auscultation, no wheezes, rales or rhonchi, symmetric air entry  Heart - S1 and S2 normal, no murmurs noted  Abdomen - soft, nontender, nondistended, no masses or organomegaly  no rebound tenderness noted  bowel sounds normal  No guarding, No RUQ pain, Some increase edema  Extremities - pedal edema 2 +, DTR +1    Presentation: Cephalic                      Fetal Heart Rate Assessment   Method: Fetal HR Assessment Method: external   Beats/min: Fetal HR (beats/min): 145   Baseline: Fetal Heart Baseline Rate: normal range   Varibility: Fetal HR Variability: moderate (amplitude range 6 to 25 bpm)   Accels: Fetal HR Accelerations: greater than/equal to 15 bpm, lasting at least 15 seconds   Decels: Fetal HR Decelerations: absent   Tracing Category:       Uterine Assessment   Method: Method: external tocotransducer   Frequency (min):     Ctx Count in 10 min:  4-5 per hour    Duration:     Intensity:     Intensity by IUPC:     Resting Tone:  Uterine Resting Tone: soft by palpation   Resting Tone by IUPC:     Kimball Units:      NST                     Indication:  Elevated blood pressure  Start time:  02:00                 End time: 02:20  15 x 15 accels x 2  Yes  No decels  Baseline  140s  Reactive NST - Yes     Laboratory Results:   Lab Results   Component Value Date     04/11/2021    HGB 10.9 (L) 04/11/2021    HCT 34.2 04/11/2021    WBC 8.97 04/11/2021     Lab Results   Component Value Date    HGB 10.9 (L) 04/11/2021     04/11/2021    AST 16 04/11/2021    ALT 6 04/11/2021     04/11/2021    URICACID 5.8 (H) 04/11/2021    BUN 6 03/09/2021    CREATININE 0.61 04/11/2021    GLUCOSE 174 (H) 03/09/2021             Assessment:  1. Intrauterine pregnancy at 35w5d weeks gestation with reactive fetal status  2. Gestational hypertension - likely heading towards pre-eclampsia        Plan:  1.  Reactive NST with reassuring fetal status  2.  Reviewed pre-eclampsia S/S for return   3.  Will have stop by tomorrow, given it is the weekend, for a blood pressure       Check.    If she does not get admitted tomorrow with her blood pressure check, she is to call the office first thing Monday morning to be seen either that day or Tuesday for close follow-up   4.  She was given a jug to do an at home 24 hour urine for protein  5. All questions have been answered.  6. D/C home.        Fareed Shetty MD  4/11/2021  02:33 EDT

## 2021-04-12 ENCOUNTER — APPOINTMENT (OUTPATIENT)
Dept: WOMENS IMAGING | Facility: HOSPITAL | Age: 21
End: 2021-04-12

## 2021-04-12 LAB
ABO GROUP BLD: NORMAL
ALBUMIN SERPL-MCNC: 2.9 G/DL (ref 3.5–5.2)
ALBUMIN/GLOB SERPL: 1.2 G/DL
ALP SERPL-CCNC: 127 U/L (ref 39–117)
ALP SERPL-CCNC: 127 U/L (ref 39–117)
ALT SERPL W P-5'-P-CCNC: 6 U/L (ref 1–33)
ALT SERPL W P-5'-P-CCNC: 6 U/L (ref 1–33)
ANION GAP SERPL CALCULATED.3IONS-SCNC: 12 MMOL/L (ref 5–15)
AST SERPL-CCNC: 18 U/L (ref 1–32)
AST SERPL-CCNC: 18 U/L (ref 1–32)
BILIRUB SERPL-MCNC: 0.3 MG/DL (ref 0–1.2)
BILIRUB SERPL-MCNC: 0.3 MG/DL (ref 0–1.2)
BLD GP AB SCN SERPL QL: NEGATIVE
BUN SERPL-MCNC: 8 MG/DL (ref 6–20)
BUN/CREAT SERPL: 11.3 (ref 7–25)
CALCIUM SPEC-SCNC: 8.3 MG/DL (ref 8.6–10.5)
CHLORIDE SERPL-SCNC: 108 MMOL/L (ref 98–107)
CO2 SERPL-SCNC: 18 MMOL/L (ref 22–29)
COLLECT DURATION TIME UR: 24 HRS
CREAT SERPL-MCNC: 0.71 MG/DL (ref 0.57–1)
CREAT SERPL-MCNC: 0.71 MG/DL (ref 0.57–1)
CREAT UR-MCNC: 198.9 MG/DL
CREATINE 24H UR-MRATE: 0.7 G/24 HR (ref 0.7–1.6)
DEPRECATED RDW RBC AUTO: 48.9 FL (ref 37–54)
ERYTHROCYTE [DISTWIDTH] IN BLOOD BY AUTOMATED COUNT: 16.1 % (ref 12.3–15.4)
GFR SERPL CREATININE-BSD FRML MDRD: 106 ML/MIN/1.73
GFR SERPL CREATININE-BSD FRML MDRD: 129 ML/MIN/1.73
GLOBULIN UR ELPH-MCNC: 2.5 GM/DL
GLUCOSE SERPL-MCNC: 122 MG/DL (ref 65–99)
HCT VFR BLD AUTO: 34.8 % (ref 34–46.6)
HGB BLD-MCNC: 11.1 G/DL (ref 12–15.9)
LDH SERPL-CCNC: 181 U/L (ref 135–214)
MCH RBC QN AUTO: 26.6 PG (ref 26.6–33)
MCHC RBC AUTO-ENTMCNC: 31.9 G/DL (ref 31.5–35.7)
MCV RBC AUTO: 83.3 FL (ref 79–97)
PLATELET # BLD AUTO: 275 10*3/MM3 (ref 140–450)
PMV BLD AUTO: 10.4 FL (ref 6–12)
POTASSIUM SERPL-SCNC: 4.1 MMOL/L (ref 3.5–5.2)
PROT 24H UR-MRATE: 1828.8 MG/24HOURS (ref 0–150)
PROT SERPL-MCNC: 5.4 G/DL (ref 6–8.5)
RBC # BLD AUTO: 4.18 10*6/MM3 (ref 3.77–5.28)
RH BLD: POSITIVE
SODIUM SERPL-SCNC: 138 MMOL/L (ref 136–145)
SPECIMEN VOL 24H UR: 350 ML
T&S EXPIRATION DATE: NORMAL
URATE SERPL-MCNC: 6 MG/DL (ref 2.4–5.7)
URATE SERPL-MCNC: 6 MG/DL (ref 2.4–5.7)
WBC # BLD AUTO: 8.89 10*3/MM3 (ref 3.4–10.8)

## 2021-04-12 PROCEDURE — 59025 FETAL NON-STRESS TEST: CPT

## 2021-04-12 PROCEDURE — 82570 ASSAY OF URINE CREATININE: CPT | Performed by: OBSTETRICS & GYNECOLOGY

## 2021-04-12 PROCEDURE — 86901 BLOOD TYPING SEROLOGIC RH(D): CPT | Performed by: OBSTETRICS & GYNECOLOGY

## 2021-04-12 PROCEDURE — 76805 OB US >/= 14 WKS SNGL FETUS: CPT

## 2021-04-12 PROCEDURE — 84550 ASSAY OF BLOOD/URIC ACID: CPT | Performed by: OBSTETRICS & GYNECOLOGY

## 2021-04-12 PROCEDURE — 76819 FETAL BIOPHYS PROFIL W/O NST: CPT

## 2021-04-12 PROCEDURE — 85027 COMPLETE CBC AUTOMATED: CPT | Performed by: OBSTETRICS & GYNECOLOGY

## 2021-04-12 PROCEDURE — 84156 ASSAY OF PROTEIN URINE: CPT | Performed by: OBSTETRICS & GYNECOLOGY

## 2021-04-12 PROCEDURE — 86850 RBC ANTIBODY SCREEN: CPT | Performed by: OBSTETRICS & GYNECOLOGY

## 2021-04-12 PROCEDURE — 80053 COMPREHEN METABOLIC PANEL: CPT | Performed by: OBSTETRICS & GYNECOLOGY

## 2021-04-12 PROCEDURE — 76819 FETAL BIOPHYS PROFIL W/O NST: CPT | Performed by: OBSTETRICS & GYNECOLOGY

## 2021-04-12 PROCEDURE — 83615 LACTATE (LD) (LDH) ENZYME: CPT | Performed by: OBSTETRICS & GYNECOLOGY

## 2021-04-12 PROCEDURE — 63710000001 ONDANSETRON PER 8 MG: Performed by: OBSTETRICS & GYNECOLOGY

## 2021-04-12 PROCEDURE — 84450 TRANSFERASE (AST) (SGOT): CPT | Performed by: OBSTETRICS & GYNECOLOGY

## 2021-04-12 PROCEDURE — 76805 OB US >/= 14 WKS SNGL FETUS: CPT | Performed by: OBSTETRICS & GYNECOLOGY

## 2021-04-12 PROCEDURE — 81050 URINALYSIS VOLUME MEASURE: CPT | Performed by: OBSTETRICS & GYNECOLOGY

## 2021-04-12 PROCEDURE — 82247 BILIRUBIN TOTAL: CPT | Performed by: OBSTETRICS & GYNECOLOGY

## 2021-04-12 PROCEDURE — 84460 ALANINE AMINO (ALT) (SGPT): CPT | Performed by: OBSTETRICS & GYNECOLOGY

## 2021-04-12 PROCEDURE — 82565 ASSAY OF CREATININE: CPT | Performed by: OBSTETRICS & GYNECOLOGY

## 2021-04-12 PROCEDURE — 86900 BLOOD TYPING SEROLOGIC ABO: CPT | Performed by: OBSTETRICS & GYNECOLOGY

## 2021-04-12 PROCEDURE — 84075 ASSAY ALKALINE PHOSPHATASE: CPT | Performed by: OBSTETRICS & GYNECOLOGY

## 2021-04-12 RX ORDER — ONDANSETRON 4 MG/1
4 TABLET, FILM COATED ORAL ONCE
Status: COMPLETED | OUTPATIENT
Start: 2021-04-12 | End: 2021-04-12

## 2021-04-12 RX ORDER — ACETAMINOPHEN 325 MG/1
650 TABLET ORAL EVERY 6 HOURS PRN
Status: DISCONTINUED | OUTPATIENT
Start: 2021-04-12 | End: 2021-04-15 | Stop reason: SDUPTHER

## 2021-04-12 RX ADMIN — ACETAMINOPHEN 650 MG: 325 TABLET ORAL at 01:56

## 2021-04-12 RX ADMIN — ONDANSETRON HYDROCHLORIDE 4 MG: 4 TABLET, FILM COATED ORAL at 02:00

## 2021-04-12 NOTE — PROGRESS NOTES
Patient name: Shirley Fox  YOB: 2000   MRN: [id number]  Admission Date: 2021  Date of Service: 2021    Shirley Fox is a 20 y.o.    at 35w6d  admitted on 2021 for pre-eclampsia without severe features    Hospital day 2    Diagnoses:   Patient Active Problem List    Diagnosis    • Gestational HTN [O13.9]    • Fatigue during pregnancy in third trimester [O26.813]        Subjective:      Shirley c/o pain in her legs due to swelling. She denies HA, vision change, CP, SOA, RUQ/EG pain or N/V.     REVIEW OF SYSTEMS:  Reports fetal movement is normal  Headache:denies   Epigastric: denies   Visual Changes: denies   Amniotic Fluid: Denies leakage of amniotic fluid.  Presence of Vaginal Bleeding Assessed: Denies vaginal bleeding  Patient Reports: No contractions  All other systems reviewed and are negative    Objective:     Vital signs:  Temp:  [98 °F (36.7 °C)-98.4 °F (36.9 °C)] 98 °F (36.7 °C)  Heart Rate:  [] 83  Resp:  [18] 18  BP: (125-152)/(80-99) 133/91      PHYSICAL EXAM:  General appearance - alert and in no distress  Mental status - alert, oriented to person, place, and time, normal mood, behavior, speech, dress, motor activity, affect appropriate   Chest - normal respiratory effort, no respiratory distress  Heart- regular rate  Abdomen - soft, gravid, nontender  Pelvic- not examined  Neurological-DTR's normal and symmetric without clonus  Extremities-3+ edema  Skin-normal coloration and turgor, no rashes, no suspicious skin lesions noted      FHTs: Category 1  TOCO: none    Cervix: last check      Most recent ultrasound:  Study Result    PAT NAME: SHIRLEY FOX  MED REC#: [id number]  BIRTH DA: 2000  PAT GEND: F  ACCOUNT#: [id number]  PAT TYPE: O  EXAM PAULO: [id number]  REF PHYS ANDRES KHAN  ACCESSION [id number]        Patient Status  ============     Inpatient        Indication  ========     Preeclampsia. Obesity        Maternal  Assessment  ==================     Height   163 cm  Height (ft)           5 ft  Height (in)          4 in  Weight  102 kg  Weight (lb)         225 lb  BMI       38.39 kg/m²        Method  =======     Transabdominal ultrasound examination. View: Suboptimal view: limited by late gestational age        Pregnancy  =========     Thomas pregnancy. Number of fetuses: 1        Dating  ======     Method of dating:            based on stated LANA  GA by prior assessment 35 w + 6 d  LANA by prior assessment:           5/11/2021  Ultrasound examination on:         4/12/2021  GA by U/S based upon:  AC, BPD, Femur, HC  GA by U/S         34 w + 3 d  LANA by U/S:      5/21/2021  Assigned:           based on stated LANA, selected on 04/12/2021  Assigned GA     35 w + 6 d  Assigned LANA:  5/11/2021  Pregnancy length           280 d        Fetal Biometry  ============     Standard  BPD      85.3 mm  34w 3d        18%        Hadlock     OFD      106.9 mm  35w 2d        35%        Irineo     HC        307.6 mm  34w 2d        3%        Hadlock     AC        306.4 mm  34w 4d        24%        Hadlock     Femur   67.1 mm  34w 4d        14%        Hadlock     HC / AC             1.00     EFW     2,449 g          23%        Hayden     EFW (lb)            5 lb  EFW (oz)           6 oz  EFW by:             Hadlock (BPD-HC-AC-FL)  Extended  Cav. septi pel. tr             5.3 mm           4.9 mm  Head / Face / Neck  Cephalic index   0.80          34%        Nicolaides     Extremities / Bony Struc  FL / BPD            0.79     FL / HC 0.22     FL / AC 0.22     Other Structures  FHR      133 bpm        General Evaluation  ================     Cardiac activity present.  bpm.  Fetal movements present.  Presentation cephalic.  Placenta posterior.  Umbilical cord Cord vessels: 3 vessel cord. Insertion site: placental insertion: normal.  Amniotic fluid Amount of AF: polyhydramnios. MVP 8.4 cm. MARY 27.2 cm. Q1 8.4 cm, Q2 6.3 cm, Q3 4.4 cm, Q4  8.1 cm.        Fetal Anatomy  ============     Cranium:            Appears normal  Midline falx:       Appears normal  Cavum septi pellucidi:    Appears normal  Cerebellum:       not visualized  Cisterna magna:             not visualized  Head / Neck  Rt lateral ventricle:          suboptimal  Lt lateral ventricle:          suboptimal  Rt choroid plexus:           suboptimal  Lt choroid plexus:           suboptimal  Vermis: not visualized  Neck:    not visualized  Lips:      not visualized  Profile:  not visualized  Nose:    not visualized  Face  Palate:  not visualized  Mandible:           not visualized  Orbits:   not visualized  Lens:     not visualized  4-chamber view:             suboptimal  RVOT view:       suboptimal  LVOT view:        suboptimal  Heart / Thorax  Aortic arch view:             not visualized  Ductal arch view:            not visualized  SVC:     not visualized  IVC:      not visualized  3-vessel view:    suboptimal  3-vessel-trachea view:    suboptimal  Rt lung: suboptimal  Lt lung:  suboptimal  Diaphragm:        Appears normal  Diaphragm:        Intact  Cord insertion:   Appears normal  Stomach:           Appears normal  Bladder:             Appears normal  Abdomen  Rt kidney:          normal  Lt kidney:           normal  Liver:     normal  Small bowel:      normal  Large bowel:      normal  Cervical spine:   suboptimal  Thoracic spine:  suboptimal  Lumbar spine:    suboptimal  Sacral spine:      suboptimal  Rt upper arm:    suboptimal  Rt forearm:        suboptimal  Rt hand:             suboptimal  Lt upper arm:     suboptimal  Lt forearm:         suboptimal  Lt hand:             suboptimal  Rt upper leg:      suboptimal  Rt lower leg:      suboptimal  Rt foot:  suboptimal  Lt upper leg:      suboptimal  Lt lower leg:       suboptimal  Lt foot:  suboptimal  Gender:             poorly seen        Biophysical Profile  ===============     2: Fetal breathing movements  2: Gross body  movements  2: Fetal tone  2: Amniotic fluid volume  8/8 Biophysical profile score        Impression  =========     Ten day symmetric lag in growth from the previously assigned gestational age. Normal 4-chamber fetal cardiac view. The right and left ventricular outflow tracts are noted  to cross by color-flow Doppler. Umbilical artery S/D ratio = 2.47 to 1 (normal). The biophysical profile is 8/8 with normal breathing motion, body motion and tone. The  amniotic fluid volume is increased with an MARY = 27.3 cm. Discussed findings with the patient.        Recommendation  ===============     Continue in-hospital evaluation of severity of preeclampsia.        Coding  ======     Description:       22364-16 Intermediate Ultrasound  Description:       17441-35 BPP without NST           Sonographer: Alisa Buckner RDMS  Physician: aSmy Villarreal MD, FACOG     Electronically signed by: Samy Villarreal MD, FACOG at: 2021/04/12 09:17         Medications:      •  acetaminophen    Labs:    Lab Results   Component Value Date    WBC 8.89 04/12/2021    HGB 11.1 (L) 04/12/2021    HCT 34.8 04/12/2021    MCV 83.3 04/12/2021     04/12/2021    URICACID 6.0 (H) 04/12/2021    URICACID 6.0 (H) 04/12/2021    AST 18 04/12/2021    AST 18 04/12/2021    ALT 6 04/12/2021    ALT 6 04/12/2021     04/12/2021     Contains abnormal data Protein, Urine, 24 Hour - Urine, Clean Catch  Order: [id number]  Status:  Final result   Visible to patient:  No (scheduled for 4/12/2021  7:51 AM)  Specimen Information: Urine, Clean Catch; 24 Hour Urine        Component   Ref Range & Units 06:40   Protein, 24H Urine   0.0 - 150.0 mg/24hours 1,828.8High     Resulting Agency  [facility]      Narrative  Performed by:  [facility]  Reference ranges are based on a 24 hour period, interperet results accordingly.      Specimen Collected: 04/12/21 06:40   Last Resulted: 04/12/21 07:51        Lab Flowsheet     Order Details     View Encounter               Results from  last 7 days   Lab Units 21  0101   ABO TYPING  A   RH TYPING  Positive   ANTIBODY SCREEN  Negative       Assessment/Plan:      Shirley is a 20 y.o.    at 35w6d.  1.   Patient Active Problem List    Diagnosis    • Gestational HTN [O13.9]    • Fatigue during pregnancy in third trimester [O26.813]    :   2. Pre-eclampsia without severe features      Plan:   1. Maternal fetal status reassuring, FHR category I  2. BPs normal to mild range  3. 24h urine with 1828mg of protein  4. S/p PDC US this AM, report as noted above  5. SCDs for DVT ppx  6. Continue inpatient management until delivery at 37wks or sooner if severe features develop     All questions were answered to the best of my ablity.    Lexie Phan MD  2021  12:56 EDT

## 2021-04-12 NOTE — PAYOR COMM NOTE
"Reji Mills (20 y.o. Female)     [employer] ID#[id number]    From: Jamila Arnav  #[phone]  Fax#[phone]      Date of Birth Social Security Number Address Home Phone MRN    2000  [id number]  [facility] [address] [phone] [id number]    Orthodoxy Marital Status          None Single       Admission Date Admission Type Admitting Provider Attending Provider Department, Room/Bed    4/11/21 Elective Kyara Lino MD Sallee, Iniko Z, CNM [facility] ANTEPARTUM, N333/1    Discharge Date Discharge Disposition Discharge Destination                       Attending Provider: Jasmin Fuller CNM    Allergies: No Known Allergies    Isolation: None   Infection: None   Code Status: Not on file    Ht: 162.6 cm (64\")   Wt: 102 kg (225 lb)    Admission Cmt: None   Principal Problem: None                Active Insurance as of 4/11/2021     Primary Coverage     Payor Plan Insurance Group Employer/Plan Group    [employer] [place] [employer] [place]      Payor Plan Address Payor Plan Phone Number Payor Plan Fax Number Effective Dates    [address] [address] [phone]  10/30/2020 - None Entered    [facility] [address]       Subscriber Name Subscriber Birth Date Member ID       REJI MILLS 2000 [id number]                 Emergency Contacts      (Rel.) Home Phone Work Phone Mobile Phone    HALEIGH JENKINS (Significant Other) -- -- [phone]    JANAMARTHA (Mother) [phone] -- --            Insurance Information                [facility]/[employer] MEDICAID Phone: [phone]    Subscriber: Reji Mills Subscriber#: [id number]    Group#:  Precert#:           Problem List         Codes Noted - Resolved       Hospital    Gestational HTN ICD-10-CM: O13.9  ICD-9-CM: 642.30 4/11/2021 - Present       Non-Hospital    Fatigue during pregnancy in third trimester ICD-10-CM: O26.813  ICD-9-CM: 646.83 3/10/2021 - Present             History & " Physical      Aydin Workman, DO at 21 1844          [facility]  Obstetric History and Physical    Referring Provider: Kyara Lino MD      Chief Complaint   Patient presents with   • Elevated Blood Pressure       Subjective     Patient is a 20 y.o. female  currently at 35w5d, who presents for follow-up blood pressure.  Patient was seen last evening after not feeling well with elevated blood pressure.  Labs were drawn which were normal and significant proteinuria.  She was sent home with a 24-hour urine protein collection. patient denies any current leaking of fluid, vaginal bleeding, persistent headache, nausea, vomiting, or other associated symptoms or concern.  Patient reports normal fetal activity..      .    The following portions of the patients history were reviewed and updated as appropriate: current medications, allergies, past medical history, past surgical history, past family history, past social history and problem list .       Prenatal Information:   Maternal Prenatal Labs  Blood Type No results found for: ABO   Rh Status No results found for: RH   Antibody Screen No results found for: ABSCRN   Gonnorhea No results found for: GCCX   Chlamydia No results found for: CLAMYDCU   RPR No results found for: RPR   Syphilis Antibody No results found for: SYPHILIS   Rubella No results found for: RUBELLAIGGIN   Hepatitis B Surface Antigen No results found for: HEPBSAG   HIV-1 Antibody No results found for: LABHIV1   Hepatitis C Antibody No results found for: HEPCAB   Rapid Urin Drug Screen No results found for: AMPMETHU, BARBITSCNUR, LABBENZSCN, LABMETHSCN, LABOPIASCN, THCURSCR, COCAINEUR, AMPHETSCREEN, PROPOXSCN, BUPRENORSCNU, METAMPSCNUR, OXYCODONESCN, TRICYCLICSCN   Group B Strep Culture No results found for: GBSANTIGEN           External Prenatal Results     Pregnancy Outside Results - Transcribed From Office Records - See Scanned Records For Details     Test Value Date Time    Hgb   10.9 g/dL 21 0051       11.3 g/dL 21 1503       9.5 g/dL 217       10.5 g/dL 02/10/21 1108       11.7 g/dL 10/30/20 1105    Hct  34.2 % 21 0051       35.6 % 21 1503       29.9 % 21 2327       33.6 % 02/10/21 1108       35.1 % 10/30/20 1105    ABO  A  20 1656    Rh  Positive  20 1656    Antibody Screen  Negative  02/10/21 1108       Negative  20 1656    Glucose Fasting GTT       Glucose Tolerance Test 1 hour       Glucose Tolerance Test 3 hour       Gonorrhea (discrete)       Chlamydia (discrete)       RPR  Non-Reactive  10/30/20 1105    VDRL       Syphilis Antibody       Rubella  Positive  10/30/20 1105    HBsAg  Non-Reactive  10/30/20 1105    Herpes Simplex Virus PCR       Herpes Simplex VIrus Culture       HIV  Non-Reactive  10/30/20 1105    Hep C RNA Quant PCR       Hep C Antibody  Non-Reactive  10/30/20 1105    AFP       Group B Strep       GBS Susceptibility to Clindamycin       GBS Susceptibility to Erythromycin       Fetal Fibronectin       Genetic Testing, Maternal Blood             Drug Screening     Test Value Date Time    Urine Drug Screen       Amphetamine Screen  Negative  10/30/20 1105    Barbiturate Screen  Negative  10/30/20 1105    Benzodiazepine Screen  Negative  10/30/20 1105    Methadone Screen  Negative  10/30/20 1105    Phencyclidine Screen  Negative  10/30/20 1105    Opiates Screen  Negative  10/30/20 1105    THC Screen  Negative  10/30/20 1105    Cocaine Screen       Propoxyphene Screen  Negative  10/30/20 1105    Buprenorphine Screen  Negative  10/30/20 1105    Methamphetamine Screen       Oxycodone Screen  Negative  10/30/20 1105    Tricyclic Antidepressants Screen  Negative  10/30/20 1105          Legend    ^: Historical                          Past OB History:       OB History    Para Term  AB Living   1 0 0 0 0 0   SAB TAB Ectopic Molar Multiple Live Births   0 0 0 0 0 0      # Outcome Date GA Lbr Abner/2nd Weight Sex  Delivery Anes PTL Lv   1 Current                Past Medical History: Past Medical History:   Diagnosis Date   • Asthma    • Seasonal allergies    • Urinary tract infection       Past Surgical History History reviewed. No pertinent surgical history.   Family History: Family History   Problem Relation Age of Onset   • No Known Problems Mother    • No Known Problems Father       Social History:  reports that she has never smoked. She has never used smokeless tobacco.   reports no history of alcohol use.   reports no history of drug use.                   General ROS Negative Findings:Headaches, Visual Changes, Epigastric pain, Anorexia, Nausia/Vomiting, ROM and Vaginal Bleeding    ROS     All other systems have been reviewed and are neg  Objective       Vital Signs Range for the last 24 hours  Temperature: Temp:  [98 °F (36.7 °C)-98.1 °F (36.7 °C)] 98 °F (36.7 °C)   Temp Source: Temp src: Oral   BP: BP: (143-152)/(86-99) 152/99   Pulse: Heart Rate:  [79] 79   Respirations: Resp:  [18] 18   SPO2:     O2 Amount (l/min):     O2 Devices     Weight: Weight:  [102 kg (225 lb)] 102 kg (225 lb)     Physical Examination:   General:   alert, appears stated age and cooperative   Skin:   normal   HEENT:  Clear clear   Lungs:      Heart:      Gastrointestinal:  Abdomen soft, gravid uterus, guarding, rebound benign exam   Lower Extremities:  3+ edema no calf tenderness   : Exam deferred.   Musculoskeletal:     Neuro:  No focal deficit, DTR 2+4 no clonus           Fetal Heart Rate Assessment   Method: Fetal HR Assessment Method: external   Beats/min: Fetal HR (beats/min): 140   Baseline: Fetal Heart Baseline Rate: normal range   Varibility: Fetal HR Variability: moderate (amplitude range 6 to 25 bpm)   Accels: Fetal HR Accelerations: greater than/equal to 15 bpm, lasting at least 15 seconds   Decels: Fetal HR Decelerations: absent   Tracing Category:       Uterine Assessment   Method: Method: external tocotransducer   Frequency  (min):     Ctx Count in 10 min:     Duration:     Intensity: Contraction Intensity: no contractions   Intensity by IUPC:     Resting Tone: Uterine Resting Tone: soft by palpation   Resting Tone by IUPC:     [place] Units:       Laboratory Results:   Lab Results (last 24 hours)     ** No results found for the last 24 hours. **        Radiology Review:   Imaging Results (Last 24 Hours)     ** No results found for the last 24 hours. **        Other Studies:    Assessment/Plan       Gestational HTN        Assessment:  1.  Intrauterine pregnancy at 35w5d weeks gestation with reactive fetal status.    2.  Preeclampsia without severe features  3.  24 urine protein in process  4.  Obesity with BMI 38.60    Plan:  1.  Observation overnight, complete 24 urine protein, labs in a.m., if greater than 2 g of protein continue inpatient management until 37 weeks gestation or severe features develop.  PDC ultrasound tomorrow if patient continues inpatient management  2. Plan of care has been reviewed with patient.  3.  Risks, benefits of treatment plan have been discussed.  4.  All questions have been answered.  5 discussed with Robyn Valiente CNM.      Aydin Workman DO  4/11/2021  18:44 EDT    Electronically signed by Aydin Workman DO at 04/11/21 1850       Vital Signs (last 2 days)     Date/Time   Temp   Temp src   Pulse   Resp   BP   Patient Position   SpO2    04/12/21 1440   98 (36.7)   --   90   18   140/86   --   --    04/12/21 1359   --   --   --   --   --   --   --    Comment rows:    OBSERV: up in shower at 04/12/21 1359    04/12/21 1300   --   --   --   --   --   --   --    Comment rows:    OBSERV: pt denies c/os at 04/12/21 1300    04/12/21 0855   --   --   --   --   --   --   --    Comment rows:    OBSERV: back from pdc at 04/12/21 0855    04/12/21 0722   98 (36.7)   Oral   83   18   133/91   Lying   --    04/12/21 0422   98.4 (36.9)   Oral   82   18   140/89   Lying   --    04/12/21 0012   98.4 (36.9)    Oral   100   18   125/82   Lying   --    04/11/21 2019   98.3 (36.8)   Oral   80   18   144/80   Lying   --    04/11/21 1923   --   --   --   --   147/93   --   --    04/11/21 1845   --   --   --   --   141/97   --   --    04/11/21 1831   --   --   --   --   --   --   100    04/11/21 1830   --   --   --   --   142/83   --   --    04/11/21 1800   --   --   --   --   152/99   --   --    04/11/21 1745   98 (36.7)   Oral   --   18   144/95   Lying   --              Facility-Administered Medications as of 4/12/2021   Medication Dose Route Frequency Provider Last Rate Last Admin   • acetaminophen (TYLENOL) tablet 650 mg  650 mg Oral Q6H PRN Fareed Shetty MD   650 mg at 04/12/21 0156   • [COMPLETED] ondansetron (ZOFRAN) tablet 4 mg  4 mg Oral Once Fareed Shetty MD   4 mg at 04/12/21 0200     Lab Results (last 48 hours)     Procedure Component Value Units Date/Time    Creatinine, Urine, 24 Hour - Urine, Clean Catch [[id number]] Collected: 04/12/21 0640    Specimen: 24 Hour Urine from Urine, Clean Catch Updated: 04/12/21 0930     Creatinine, 24H 0.70 g/24 hr      Creatinine, Urine 198.9 mg/dL      24H Urine Volume 350 mL      Time (Hours) 24 hrs     Protein, Urine, 24 Hour - Urine, Clean Catch [[id number]]  (Abnormal) Collected: 04/12/21 0640    Specimen: 24 Hour Urine from Urine, Clean Catch Updated: 04/12/21 0751     Protein, 24H Urine 1,828.8 mg/24hours     Narrative:      Reference ranges are based on a 24 hour period, interperet results accordingly.    Preeclampsia Panel [[id number]]  (Abnormal) Collected: 04/12/21 0101    Specimen: Blood Updated: 04/12/21 0138     Alkaline Phosphatase 127 U/L      ALT (SGPT) 6 U/L      AST (SGOT) 18 U/L      Creatinine 0.71 mg/dL      Total Bilirubin 0.3 mg/dL       U/L      Comment: Specimen hemolyzed.  Results may be affected.        Uric Acid 6.0 mg/dL     Uric Acid [[id number]]  (Abnormal) Collected: 04/12/21 0101    Specimen: Blood Updated: 04/12/21 0135     Uric Acid 6.0  mg/dL      Comment: Falsely depressed results may occur on samples drawn from patients receiving N-Acetylcysteine (NAC) or Metamizole.       Comprehensive Metabolic Panel [[id number]]  (Abnormal) Collected: 04/12/21 0101    Specimen: Blood Updated: 04/12/21 0135     Glucose 122 mg/dL      BUN 8 mg/dL      Creatinine 0.71 mg/dL      Sodium 138 mmol/L      Potassium 4.1 mmol/L      Comment: Slight hemolysis detected by analyzer. Results may be affected.        Chloride 108 mmol/L      CO2 18.0 mmol/L      Calcium 8.3 mg/dL      Total Protein 5.4 g/dL      Albumin 2.90 g/dL      ALT (SGPT) 6 U/L      AST (SGOT) 18 U/L      Alkaline Phosphatase 127 U/L      Total Bilirubin 0.3 mg/dL      eGFR Non African Amer 106 mL/min/1.73      eGFR  African Amer 129 mL/min/1.73      Globulin 2.5 gm/dL      A/G Ratio 1.2 g/dL      BUN/Creatinine Ratio 11.3     Anion Gap 12.0 mmol/L     Narrative:      GFR Normal >60  Chronic Kidney Disease <60  Kidney Failure <15      CBC (No Diff) [[id number]]  (Abnormal) Collected: 04/12/21 0101    Specimen: Blood Updated: 04/12/21 0118     WBC 8.89 10*3/mm3      RBC 4.18 10*6/mm3      Hemoglobin 11.1 g/dL      Hematocrit 34.8 %      MCV 83.3 fL      MCH 26.6 pg      MCHC 31.9 g/dL      RDW 16.1 %      RDW-SD 48.9 fl      MPV 10.4 fL      Platelets 275 10*3/mm3     COVID PRE-OP / PRE-PROCEDURE SCREENING ORDER (NO ISOLATION) - Swab, Nasopharynx [[id number]]  (Normal) Collected: 04/11/21 2027    Specimen: Swab from Nasopharynx Updated: 04/11/21 2203    Narrative:      The following orders were created for panel order COVID PRE-OP / PRE-PROCEDURE SCREENING ORDER (NO ISOLATION) - Swab, Nasopharynx.  Procedure                               Abnormality         Status                     ---------                               -----------         ------                     COVID-19, ABBOTT IN-HOUS...[[id number]]  Normal              Final result                 Please view results for these tests on the  individual orders.    COVID-19, ABBOTT IN-HOUSE,NASAL Swab (NO TRANSPORT MEDIA) 2 HR TAT - Swab, Nasopharynx [[id number]]  (Normal) Collected: 21    Specimen: Swab from Nasopharynx Updated: 21     COVID19 Presumptive Negative    Narrative:      Fact sheet for providers: https://www.fda.gov/media/425134/download     Fact sheet for patients: https://www.fda.gov/media/643060/download    Test performed by PCR.  Fact sheet for providers: https://www.fda.gov/media/928603/download     Fact sheet for patients: https://www.fda.gov/media/688198/download    Test performed by PCR.  If inconsistent with clinical signs and symptoms patient should be tested with different authorized molecular test.    POC Protein, Urine, Qualitative, Dipstick [[id number]]  (Abnormal) Collected: 21    Specimen: Urine Updated: 21     Protein, POC 3+ mg/dL      Lot Number [id number]     Expiration Date 2022          Imaging Results (Last 48 Hours)     Procedure Component Value Units Date/Time    [facility] [[id number]] Collected: 21     Updated: 21    Narrative:      PAT NAME: REJI FOX  MED REC#: [id number]  BIRTH DA: 2000  PAT GEND: F  ACCOUNT#: [id number]  PAT TYPE: O  EXAM PAULO: [id number]  REF PHYS ANDRES KHAN  ACCESSION [id number]      Patient Status  ============    Inpatient      Indication  ========    Preeclampsia. Obesity      Maternal Assessment  ==================    Height 163 cm  Height (ft) 5 ft  Height (in) 4 in  Weight 102 kg  Weight (lb) 225 lb  BMI 38.39 kg/m²      Method  =======    Transabdominal ultrasound examination. View: Suboptimal view: limited by late gestational age      Pregnancy  =========    Thomas pregnancy. Number of fetuses: 1      Dating  ======    Method of dating: based on stated LANA  GA by prior assessment 35 w + 6 d  LANA by prior assessment: 2021  Ultrasound examination on: 2021  GA by U/S  based upon: AC, BPD, Femur, HC  GA by U/S 34 w + 3 d  LANA by U/S: 5/21/2021  Assigned: based on stated LANA, selected on 04/12/2021  Assigned GA 35 w + 6 d  Assigned LANA: 5/11/2021  Pregnancy length 280 d      Fetal Biometry  ============    Standard  BPD 85.3 mm  34w 3d        18%        Hadlock    .9 mm  35w 2d        35%        Irineo    .6 mm  34w 2d        3%        Hadlock    .4 mm  34w 4d        24%        Hadlock    Femur 67.1 mm  34w 4d        14%        Hadlock    HC / AC 1.00    EFW 2,449 g          23%        Hayden    EFW (lb) 5 lb  EFW (oz) 6 oz  EFW by: Hadlock (BPD-HC-AC-FL)  Extended  Cav. septi pel. tr 5.3 mm   4.9 mm  Head / Face / Neck  Cephalic index 0.80          34%        Nicolaides    Extremities / Bony Struc  FL / BPD 0.79    FL / HC 0.22    FL / AC 0.22    Other Structures   bpm      General Evaluation  ================    Cardiac activity present.  bpm.  Fetal movements present.  Presentation cephalic.  Placenta posterior.  Umbilical cord Cord vessels: 3 vessel cord. Insertion site: placental insertion: normal.  Amniotic fluid Amount of AF: polyhydramnios. MVP 8.4 cm. MARY 27.2 cm. Q1 8.4 cm, Q2 6.3 cm, Q3 4.4 cm, Q4 8.1 cm.      Fetal Anatomy  ============    Cranium: Appears normal  Midline falx: Appears normal  Cavum septi pellucidi: Appears normal  Cerebellum: not visualized  Cisterna magna: not visualized  Head / Neck  Rt lateral ventricle: suboptimal  Lt lateral ventricle: suboptimal  Rt choroid plexus: suboptimal  Lt choroid plexus: suboptimal  Vermis: not visualized  Neck: not visualized  Lips: not visualized  Profile: not visualized  Nose: not visualized  Face  Palate: not visualized  Mandible: not visualized  Orbits: not visualized  Lens: not visualized  4-chamber view: suboptimal  RVOT view: suboptimal  LVOT view: suboptimal  Heart / Thorax  Aortic arch view: not visualized  Ductal arch view: not visualized  SVC: not visualized  IVC: not  visualized  3-vessel view: suboptimal  3-vessel-trachea view: suboptimal  Rt lung: suboptimal  Lt lung: suboptimal  Diaphragm: Appears normal  Diaphragm: Intact  Cord insertion: Appears normal  Stomach: Appears normal  Bladder: Appears normal  Abdomen  Rt kidney: normal  Lt kidney: normal  Liver: normal  Small bowel: normal  Large bowel: normal  Cervical spine: suboptimal  Thoracic spine: suboptimal  Lumbar spine: suboptimal  Sacral spine: suboptimal  Rt upper arm: suboptimal  Rt forearm: suboptimal  Rt hand: suboptimal  Lt upper arm: suboptimal  Lt forearm: suboptimal  Lt hand: suboptimal  Rt upper leg: suboptimal  Rt lower leg: suboptimal  Rt foot: suboptimal  Lt upper leg: suboptimal  Lt lower leg: suboptimal  Lt foot: suboptimal  Gender: poorly seen      Biophysical Profile  ===============    2: Fetal breathing movements  2: Gross body movements  2: Fetal tone  2: Amniotic fluid volume  8/8 Biophysical profile score      Impression  =========    Ten day symmetric lag in growth from the previously assigned gestational age. Normal 4-chamber fetal cardiac view. The right and left ventricular outflow tracts are noted  to cross by color-flow Doppler. Umbilical artery S/D ratio = 2.47 to 1 (normal). The biophysical profile is 8/8 with normal breathing motion, body motion and tone. The  amniotic fluid volume is increased with an MARY = 27.3 cm. Discussed findings with the patient.      Recommendation  ===============    Continue in-hospital evaluation of severity of preeclampsia.      Coding  ======    Description: 35678-54 Intermediate Ultrasound  Description: 88087-79 BPP without NST        Sonographer: Alisa Buckner [facility]  Physician: Samy Villarreal MD, FACOG    Electronically signed by: Samy Villarreal MD, FACOG at: 2021/04/12 09:17          Orders (last 48 hrs)      Start     Ordered    04/12/21 1302  Place Sequential Compression Device  Once      04/12/21 1301    04/12/21 1302  Maintain Sequential Compression Device   Continuous      21 1301    21 1214  Diet Regular  Diet Effective Now      21 1214    21 0900  [facility]  1 Time Imaging      21 19521 0800  Fetal Nonstress Test  3 Times Daily     Comments: Patient presents with:  Elevated Blood Pressure      21 0600  CBC (No Diff)  Morning Draw,   Status:  Canceled      21 1843    04/12/21 0600  Preeclampsia Panel  Morning Draw      21 1843    04/12/21 0600  Type & Screen  Morning Draw,   Status:  Canceled      21 1843    04/12/21 0513  NPO Diet  Diet Effective Now,   Status:  Canceled      21 0512    21 0245  ondansetron (ZOFRAN) tablet 4 mg  Once      21 0157    21 0146  acetaminophen (TYLENOL) tablet 650 mg  Every 6 Hours PRN      21 0146    21 0040  Type & Screen  STAT      21 0039    21 0034  Uric Acid  STAT      21 0033    21 0033  CBC (No Diff)  STAT      21 0033    21 0033  Comprehensive Metabolic Panel  STAT      21 0033    21 2000  Vital Signs  Every 4 Hours      21 18421  COVID PRE-OP / PRE-PROCEDURE SCREENING ORDER (NO ISOLATION) - Swab, Nasopharynx  Once      21 18421 1843  COVID-19, ABBOTT IN-HOUSE,NASAL Swab (NO TRANSPORT MEDIA) 2 HR TAT - Swab, Nasopharynx  PROCEDURE ONCE      21 18421 1843  Diet Regular  Diet Effective Now,   Status:  Canceled      21 18421 184  Place Sequential Compression Device  Once,   Status:  Canceled      21 18421  Maintain Sequential Compression Device  Continuous,   Status:  Canceled      21 18421 184  Protein, Urine, 24 Hour - Urine, Clean Catch  Once      21 18421 1842  Creatinine, Urine, 24 Hour - Urine, Clean Catch  Once      21 1842    21 1722  POC Protein, Urine, Qualitative, Dipstick  Once      21  1721    21 1721  Fetal Nonstress Test  Once,   Status:  Canceled     Comments: No chief complaint on file.      21 1720    21 1721  Outpatient In A Bed  Once      21 1720                 Physician Progress Notes (last 48 hours) (Notes from 04/10/21 1518 through 21 1518)      Lexie Phan MD at 21 1256          Patient name: Shirley Mills  YOB: 2000   MRN: [id number]  Admission Date: 2021  Date of Service: 2021    Shirley Mills is a 20 y.o.    at 35w6d  admitted on 2021 for pre-eclampsia without severe features    Hospital day 2    Diagnoses:   Patient Active Problem List    Diagnosis    • Gestational HTN [O13.9]    • Fatigue during pregnancy in third trimester [O26.813]        Subjective:      Shirley c/o pain in her legs due to swelling. She denies HA, vision change, CP, SOA, RUQ/EG pain or N/V.     REVIEW OF SYSTEMS:  Reports fetal movement is normal  Headache:denies   Epigastric: denies   Visual Changes: denies   Amniotic Fluid: Denies leakage of amniotic fluid.  Presence of Vaginal Bleeding Assessed: Denies vaginal bleeding  Patient Reports: No contractions  All other systems reviewed and are negative    Objective:     Vital signs:  Temp:  [98 °F (36.7 °C)-98.4 °F (36.9 °C)] 98 °F (36.7 °C)  Heart Rate:  [] 83  Resp:  [18] 18  BP: (125-152)/(80-99) 133/91      PHYSICAL EXAM:  General appearance - alert and in no distress  Mental status - alert, oriented to person, place, and time, normal mood, behavior, speech, dress, motor activity, affect appropriate   Chest - normal respiratory effort, no respiratory distress  Heart- regular rate  Abdomen - soft, gravid, nontender  Pelvic- not examined  Neurological-DTR's normal and symmetric without clonus  Extremities-3+ edema  Skin-normal coloration and turgor, no rashes, no suspicious skin lesions noted      FHTs: Category 1  TOCO: none    Cervix: last check      Most recent  ultrasound:  Study Result    PAT NAME: REJI FOX  [facility] REC#: [id number]  BIRTH DA: 2000  PAT GEND: F  ACCOUNT#: [id number]  PAT TYPE: O  EXAM PAULO: [id number]  REF PHYS ANDRES KHAN  ACCESSION [id number]        Patient Status  ============     Inpatient        Indication  ========     Preeclampsia. Obesity        Maternal Assessment  ==================     Height   163 cm  Height (ft)           5 ft  Height (in)          4 in  Weight  102 kg  Weight (lb)         225 lb  BMI       38.39 kg/m²        Method  =======     Transabdominal ultrasound examination. View: Suboptimal view: limited by late gestational age        Pregnancy  =========     Thomas pregnancy. Number of fetuses: 1        Dating  ======     Method of dating:            based on stated LANA  GA by prior assessment 35 w + 6 d  LANA by prior assessment:           5/11/2021  Ultrasound examination on:         4/12/2021  GA by U/S based upon:  AC, BPD, Femur, HC  GA by U/S         34 w + 3 d  LANA by U/S:      5/21/2021  Assigned:           based on stated LANA, selected on 04/12/2021  Assigned GA     35 w + 6 d  Assigned LANA:  5/11/2021  Pregnancy length           280 d        Fetal Biometry  ============     Standard  BPD      85.3 mm  34w 3d        18%        Hadlock     OFD      106.9 mm  35w 2d        35%        Irineo     HC        307.6 mm  34w 2d        3%        Hadlock     AC        306.4 mm  34w 4d        24%        Hadlock     Femur   67.1 mm  34w 4d        14%        Hadlock     HC / AC             1.00     EFW     2,449 g          23%        Hayden     EFW (lb)            5 lb  EFW (oz)           6 oz  EFW by:             Hadlock (BPD-HC-AC-FL)  Extended  Cav. septi pel. tr             5.3 mm           4.9 mm  Head / Face / Neck  Cephalic index   0.80          34%        Nicolaides     Extremities / Bony Struc  FL / BPD            0.79     FL / HC 0.22     FL / AC 0.22     Other Structures  FHR      133 bpm        General  Evaluation  ================     Cardiac activity present.  bpm.  Fetal movements present.  Presentation cephalic.  Placenta posterior.  Umbilical cord Cord vessels: 3 vessel cord. Insertion site: placental insertion: normal.  Amniotic fluid Amount of AF: polyhydramnios. MVP 8.4 cm. MARY 27.2 cm. Q1 8.4 cm, Q2 6.3 cm, Q3 4.4 cm, Q4 8.1 cm.        Fetal Anatomy  ============     Cranium:            Appears normal  Midline falx:       Appears normal  Cavum septi pellucidi:    Appears normal  Cerebellum:       not visualized  Cisterna magna:             not visualized  Head / Neck  Rt lateral ventricle:          suboptimal  Lt lateral ventricle:          suboptimal  Rt choroid plexus:           suboptimal  Lt choroid plexus:           suboptimal  Vermis: not visualized  Neck:    not visualized  Lips:      not visualized  Profile:  not visualized  Nose:    not visualized  Face  Palate:  not visualized  Mandible:           not visualized  Orbits:   not visualized  Lens:     not visualized  4-chamber view:             suboptimal  RVOT view:       suboptimal  LVOT view:        suboptimal  Heart / Thorax  Aortic arch view:             not visualized  Ductal arch view:            not visualized  SVC:     not visualized  IVC:      not visualized  3-vessel view:    suboptimal  3-vessel-trachea view:    suboptimal  Rt lung: suboptimal  Lt lung:  suboptimal  Diaphragm:        Appears normal  Diaphragm:        Intact  Cord insertion:   Appears normal  Stomach:           Appears normal  Bladder:             Appears normal  Abdomen  Rt kidney:          normal  Lt kidney:           normal  Liver:     normal  Small bowel:      normal  Large bowel:      normal  Cervical spine:   suboptimal  Thoracic spine:  suboptimal  Lumbar spine:    suboptimal  Sacral spine:      suboptimal  Rt upper arm:    suboptimal  Rt forearm:        suboptimal  Rt hand:             suboptimal  Lt upper arm:     suboptimal  Lt forearm:          suboptimal  Lt hand:             suboptimal  Rt upper leg:      suboptimal  Rt lower leg:      suboptimal  Rt foot:  suboptimal  Lt upper leg:      suboptimal  Lt lower leg:       suboptimal  Lt foot:  suboptimal  Gender:             poorly seen        Biophysical Profile  ===============     2: Fetal breathing movements  2: Gross body movements  2: Fetal tone  2: Amniotic fluid volume  8/8 Biophysical profile score        Impression  =========     Ten day symmetric lag in growth from the previously assigned gestational age. Normal 4-chamber fetal cardiac view. The right and left ventricular outflow tracts are noted  to cross by color-flow Doppler. Umbilical artery S/D ratio = 2.47 to 1 (normal). The biophysical profile is 8/8 with normal breathing motion, body motion and tone. The  amniotic fluid volume is increased with an MARY = 27.3 cm. Discussed findings with the patient.        Recommendation  ===============     Continue in-hospital evaluation of severity of preeclampsia.        Coding  ======     Description:       47245-27 Intermediate Ultrasound  Description:       94065-29 BPP without NST           Sonographer: Alisa Buckner [facility]  Physician: Samy Villarreal MD, FACOG     Electronically signed by: Samy Villarreal MD, FACOG at: 2021/04/12 09:17         Medications:      •  acetaminophen    Labs:    Lab Results   Component Value Date    WBC 8.89 04/12/2021    HGB 11.1 (L) 04/12/2021    HCT 34.8 04/12/2021    MCV 83.3 04/12/2021     04/12/2021    URICACID 6.0 (H) 04/12/2021    URICACID 6.0 (H) 04/12/2021    AST 18 04/12/2021    AST 18 04/12/2021    ALT 6 04/12/2021    ALT 6 04/12/2021     04/12/2021     Contains abnormal data Protein, Urine, 24 Hour - Urine, Clean Catch  Order: [id number]  Status:  Final result   Visible to patient:  No (scheduled for 4/12/2021  7:51 AM)  Specimen Information: Urine, Clean Catch; 24 Hour Urine        Component   Ref Range & Units 06:40   Protein, 24H Urine   0.0 - 150.0  mg/24hours 1,828.8High     Resulting Agency  [facility]      Narrative  Performed by:  [facility]  Reference ranges are based on a 24 hour period, interperet results accordingly.      Specimen Collected: 21 06:40   Last Resulted: 21 07:51        Lab Flowsheet     Order Details     View Encounter               Results from last 7 days   Lab Units 21  0101   ABO TYPING  A   RH TYPING  Positive   ANTIBODY SCREEN  Negative       Assessment/Plan:      Shirley is a 20 y.o.    at 35w6d.  1.   Patient Active Problem List    Diagnosis    • Gestational HTN [O13.9]    • Fatigue during pregnancy in third trimester [O26.813]    :   2. Pre-eclampsia without severe features      Plan:   1. Maternal fetal status reassuring, FHR category I  2. BPs normal to mild range  3. 24h urine with 1828mg of protein  4. S/p PDC US this AM, report as noted above  5. SCDs for DVT ppx  6. Continue inpatient management until delivery at 37wks or sooner if severe features develop     All questions were answered to the best of my ablity.    Lexie Phan MD  2021  12:56 EDT      Electronically signed by Lexie hPan MD at 21 1301       Consult Notes (last 48 hours) (Notes from 04/10/21 1518 through 21 1518)    No notes of this type exist for this encounter.         FHR (last day)     Date/Time Fetal HR Assessment Method Fetal HR (beats/min) Fetal Heart Baseline Rate Fetal HR Variability Fetal HR Accelerations Fetal HR Decelerations Fetal HR Tracing Category    21 2215  external  135  normal range  moderate (amplitude range 6 to 25 bpm)  greater than/equal to 15 bpm;lasting at least 15 seconds  absent  --    21 2200  external  135  normal range  moderate (amplitude range 6 to 25 bpm)  greater than/equal to 15 bpm;lasting at least 15 seconds  absent  --    21 1845  external  140  normal range  moderate (amplitude range 6 to 25 bpm)  greater than/equal to 15 bpm;lasting at least 15 seconds   absent  --    04/11/21 1815  external  140  normal range  moderate (amplitude range 6 to 25 bpm)  greater than/equal to 15 bpm;lasting at least 15 seconds  absent  --    04/11/21 1800  external  140  normal range  moderate (amplitude range 6 to 25 bpm)  greater than/equal to 15 bpm;lasting at least 15 seconds  absent  --    04/11/21 1745  external  140  normal range  moderate (amplitude range 6 to 25 bpm)  greater than/equal to 15 bpm;lasting at least 15 seconds  absent  --        Uterine Activity (last day)     Date/Time Method Contraction Frequency (Minutes) Contraction Duration (sec) Contraction Intensity Uterine Resting Tone Contraction Pattern    04/12/21 0900  --  --  --  --  soft by palpation  --    04/11/21 2215  external tocotransducer  X1  70  --  --  --    04/11/21 2200  external tocotransducer  X2    --  --  --    04/11/21 1845  external tocotransducer  --  --  no contractions  --  --    04/11/21 1815  external tocotransducer  --  --  no contractions  --  --    04/11/21 1800  external tocotransducer  --  --  no contractions  --  --    04/11/21 1745  external tocotransducer  --  --  no contractions  --  --    04/11/21 1741  palpation  --  --  --  soft by palpation  --

## 2021-04-13 PROCEDURE — 59025 FETAL NON-STRESS TEST: CPT

## 2021-04-13 PROCEDURE — 87081 CULTURE SCREEN ONLY: CPT | Performed by: ADVANCED PRACTICE MIDWIFE

## 2021-04-13 RX ORDER — DOCUSATE SODIUM 100 MG/1
100 CAPSULE, LIQUID FILLED ORAL 2 TIMES DAILY
Status: DISCONTINUED | OUTPATIENT
Start: 2021-04-13 | End: 2021-04-15 | Stop reason: SDUPTHER

## 2021-04-13 RX ADMIN — DOCUSATE SODIUM 100 MG: 100 CAPSULE, LIQUID FILLED ORAL at 12:25

## 2021-04-13 NOTE — PROGRESS NOTES
Antepartum Progress Note    Patient name: Shirley Mills  YOB: 2000   MRN: [id number]  Admission Date: 2021  Date of Service: 2021    Shirley Mills is a 20 y.o.    at 36w0d  admitted on 2021 for Gestational hypertension  Hospital day 0      Subjective:      Shirley has no complaints today.  She reports persistent edema.     REVIEW OF SYSTEMS:  Reports fetal movement is normal  Headache:denies   Epigastric: denies   Visual Changes: denies   Amniotic Fluid: Denies leakage of amniotic fluid.  Presence of Vaginal Bleeding Assessed: Denies vaginal bleeding  Patient Reports: No contractions  All other systems reviewed and are negative    Objective:     Vital signs:  Temp:  [98 °F (36.7 °C)-98.5 °F (36.9 °C)] 98.4 °F (36.9 °C)  Heart Rate:  [] 76  Resp:  [16-18] 16  BP: (130-150)/(70-86) 148/83      PHYSICAL EXAM:  General appearance - alert and in no distress  Mental status - alert, oriented to person, place, and time, normal mood, behavior, speech, dress, motor activity, affect appropriate   Chest - not examined  Heart- not examined  Abdomen - soft, nontender, , no CVA tenderness, no hernias noted  Pelvic- not examined  Neurological-DTR's normal and symmetric without clonus, DTR 1/4  Extremities-2+ edema, James's sign negative bilaterally, no redness or tenderness in the calves or thighs  Skin-normal coloration and turgor, no rashes, no suspicious skin lesions noted      FHT's: Category 1, 140s reactive NST  TOCO: none      Labs:    Lab Results   Component Value Date    WBC 8.89 2021    HGB 11.1 (L) 2021    HCT 34.8 2021    MCV 83.3 2021     2021    URICACID 6.0 (H) 2021    URICACID 6.0 (H) 2021    AST 18 2021    AST 18 2021    ALT 6 2021    ALT 6 2021     2021     Results from last 7 days   Lab Units 21  0101   ABO TYPING  A   RH TYPING  Positive   ANTIBODY SCREEN   Negative       Assessment/Plan:      Shirley is a 20 y.o.    at 36w0d.  1.  Preeclampsia without severe features: Blood pressure continue to be in the normal to mild range.  We will continue to monitor blood pressures, recheck preeclampsia labs in the morning.  We will continue inpatient management until delivery at 37 weeks or sooner if severe features develop.     All questions were answered to the best of my ability.    Kyara Lino MD  2021  08:18 EDT

## 2021-04-14 ENCOUNTER — APPOINTMENT (OUTPATIENT)
Dept: WOMENS IMAGING | Facility: HOSPITAL | Age: 21
End: 2021-04-14

## 2021-04-14 PROBLEM — O14.13 SEVERE PREECLAMPSIA, THIRD TRIMESTER: Status: ACTIVE | Noted: 2021-04-14

## 2021-04-14 LAB
ALP SERPL-CCNC: 109 U/L (ref 39–117)
ALT SERPL W P-5'-P-CCNC: <5 U/L (ref 1–33)
AST SERPL-CCNC: 13 U/L (ref 1–32)
BILIRUB SERPL-MCNC: 0.2 MG/DL (ref 0–1.2)
CREAT SERPL-MCNC: 0.5 MG/DL (ref 0.57–1)
DEPRECATED RDW RBC AUTO: 49.6 FL (ref 37–54)
ERYTHROCYTE [DISTWIDTH] IN BLOOD BY AUTOMATED COUNT: 16 % (ref 12.3–15.4)
HCT VFR BLD AUTO: 32.4 % (ref 34–46.6)
HGB BLD-MCNC: 10.2 G/DL (ref 12–15.9)
LDH SERPL-CCNC: 158 U/L (ref 135–214)
MCH RBC QN AUTO: 26.8 PG (ref 26.6–33)
MCHC RBC AUTO-ENTMCNC: 31.5 G/DL (ref 31.5–35.7)
MCV RBC AUTO: 85.3 FL (ref 79–97)
PLATELET # BLD AUTO: 245 10*3/MM3 (ref 140–450)
PMV BLD AUTO: 10.6 FL (ref 6–12)
RBC # BLD AUTO: 3.8 10*6/MM3 (ref 3.77–5.28)
URATE SERPL-MCNC: 5.5 MG/DL (ref 2.4–5.7)
WBC # BLD AUTO: 10.06 10*3/MM3 (ref 3.4–10.8)

## 2021-04-14 PROCEDURE — 25010000002 PENICILLIN G POTASSIUM PER 600000 UNITS: Performed by: ADVANCED PRACTICE MIDWIFE

## 2021-04-14 PROCEDURE — 99232 SBSQ HOSP IP/OBS MODERATE 35: CPT | Performed by: OBSTETRICS & GYNECOLOGY

## 2021-04-14 PROCEDURE — 84450 TRANSFERASE (AST) (SGOT): CPT | Performed by: OBSTETRICS & GYNECOLOGY

## 2021-04-14 PROCEDURE — 59025 FETAL NON-STRESS TEST: CPT

## 2021-04-14 PROCEDURE — 82565 ASSAY OF CREATININE: CPT | Performed by: OBSTETRICS & GYNECOLOGY

## 2021-04-14 PROCEDURE — 3E033VJ INTRODUCTION OF OTHER HORMONE INTO PERIPHERAL VEIN, PERCUTANEOUS APPROACH: ICD-10-PCS | Performed by: ADVANCED PRACTICE MIDWIFE

## 2021-04-14 PROCEDURE — 59200 INSERT CERVICAL DILATOR: CPT | Performed by: OBSTETRICS & GYNECOLOGY

## 2021-04-14 PROCEDURE — 84460 ALANINE AMINO (ALT) (SGPT): CPT | Performed by: OBSTETRICS & GYNECOLOGY

## 2021-04-14 PROCEDURE — 82247 BILIRUBIN TOTAL: CPT | Performed by: OBSTETRICS & GYNECOLOGY

## 2021-04-14 PROCEDURE — 85027 COMPLETE CBC AUTOMATED: CPT | Performed by: OBSTETRICS & GYNECOLOGY

## 2021-04-14 PROCEDURE — 83615 LACTATE (LD) (LDH) ENZYME: CPT | Performed by: OBSTETRICS & GYNECOLOGY

## 2021-04-14 PROCEDURE — 84075 ASSAY ALKALINE PHOSPHATASE: CPT | Performed by: OBSTETRICS & GYNECOLOGY

## 2021-04-14 PROCEDURE — 84550 ASSAY OF BLOOD/URIC ACID: CPT | Performed by: OBSTETRICS & GYNECOLOGY

## 2021-04-14 PROCEDURE — 25010000002 BUTORPHANOL PER 1 MG: Performed by: ADVANCED PRACTICE MIDWIFE

## 2021-04-14 RX ORDER — LIDOCAINE HYDROCHLORIDE 10 MG/ML
5 INJECTION, SOLUTION EPIDURAL; INFILTRATION; INTRACAUDAL; PERINEURAL AS NEEDED
Status: DISCONTINUED | OUTPATIENT
Start: 2021-04-14 | End: 2021-04-16

## 2021-04-14 RX ORDER — MAGNESIUM SULFATE HEPTAHYDRATE 40 MG/ML
1 INJECTION, SOLUTION INTRAVENOUS CONTINUOUS
Status: DISCONTINUED | OUTPATIENT
Start: 2021-04-14 | End: 2021-04-16

## 2021-04-14 RX ORDER — FAMOTIDINE 20 MG/1
20 TABLET, FILM COATED ORAL EVERY 12 HOURS SCHEDULED
Status: DISCONTINUED | OUTPATIENT
Start: 2021-04-14 | End: 2021-04-16

## 2021-04-14 RX ORDER — ONDANSETRON 4 MG/1
4 TABLET, FILM COATED ORAL EVERY 6 HOURS PRN
Status: DISCONTINUED | OUTPATIENT
Start: 2021-04-14 | End: 2021-04-16

## 2021-04-14 RX ORDER — BUTORPHANOL TARTRATE 1 MG/ML
1 INJECTION, SOLUTION INTRAMUSCULAR; INTRAVENOUS
Status: DISCONTINUED | OUTPATIENT
Start: 2021-04-14 | End: 2021-04-16

## 2021-04-14 RX ORDER — ONDANSETRON 2 MG/ML
4 INJECTION INTRAMUSCULAR; INTRAVENOUS EVERY 6 HOURS PRN
Status: DISCONTINUED | OUTPATIENT
Start: 2021-04-14 | End: 2021-04-16

## 2021-04-14 RX ORDER — MAGNESIUM SULFATE HEPTAHYDRATE 40 MG/ML
INJECTION, SOLUTION INTRAVENOUS
Status: DISPENSED
Start: 2021-04-14 | End: 2021-04-14

## 2021-04-14 RX ORDER — SODIUM CHLORIDE, SODIUM LACTATE, POTASSIUM CHLORIDE, CALCIUM CHLORIDE 600; 310; 30; 20 MG/100ML; MG/100ML; MG/100ML; MG/100ML
125 INJECTION, SOLUTION INTRAVENOUS CONTINUOUS
Status: DISCONTINUED | OUTPATIENT
Start: 2021-04-14 | End: 2021-04-16

## 2021-04-14 RX ORDER — METHYLERGONOVINE MALEATE 0.2 MG/ML
200 INJECTION INTRAVENOUS ONCE AS NEEDED
Status: DISCONTINUED | OUTPATIENT
Start: 2021-04-14 | End: 2021-04-16

## 2021-04-14 RX ORDER — CARBOPROST TROMETHAMINE 250 UG/ML
250 INJECTION, SOLUTION INTRAMUSCULAR AS NEEDED
Status: DISCONTINUED | OUTPATIENT
Start: 2021-04-14 | End: 2021-04-16

## 2021-04-14 RX ORDER — OXYTOCIN-SODIUM CHLORIDE 0.9% IV SOLN 30 UNIT/500ML 30-0.9/5 UT/ML-%
2-20 SOLUTION INTRAVENOUS
Status: DISCONTINUED | OUTPATIENT
Start: 2021-04-14 | End: 2021-04-16

## 2021-04-14 RX ORDER — SODIUM CHLORIDE 0.9 % (FLUSH) 0.9 %
10 SYRINGE (ML) INJECTION AS NEEDED
Status: DISCONTINUED | OUTPATIENT
Start: 2021-04-14 | End: 2021-04-14

## 2021-04-14 RX ORDER — MAGNESIUM CARB/ALUMINUM HYDROX 105-160MG
30 TABLET,CHEWABLE ORAL ONCE
Status: DISCONTINUED | OUTPATIENT
Start: 2021-04-14 | End: 2021-04-16

## 2021-04-14 RX ORDER — OXYTOCIN-SODIUM CHLORIDE 0.9% IV SOLN 30 UNIT/500ML 30-0.9/5 UT/ML-%
650 SOLUTION INTRAVENOUS ONCE
Status: CANCELLED | OUTPATIENT
Start: 2021-04-14

## 2021-04-14 RX ORDER — SODIUM CHLORIDE 0.9 % (FLUSH) 0.9 %
3 SYRINGE (ML) INJECTION EVERY 12 HOURS SCHEDULED
Status: DISCONTINUED | OUTPATIENT
Start: 2021-04-14 | End: 2021-04-16

## 2021-04-14 RX ORDER — MISOPROSTOL 200 UG/1
800 TABLET ORAL AS NEEDED
Status: DISCONTINUED | OUTPATIENT
Start: 2021-04-14 | End: 2021-04-16

## 2021-04-14 RX ORDER — FAMOTIDINE 10 MG/ML
20 INJECTION, SOLUTION INTRAVENOUS EVERY 12 HOURS SCHEDULED
Status: DISCONTINUED | OUTPATIENT
Start: 2021-04-14 | End: 2021-04-16

## 2021-04-14 RX ORDER — PENICILLIN G 3000000 [IU]/50ML
3 INJECTION, SOLUTION INTRAVENOUS EVERY 4 HOURS
Status: COMPLETED | OUTPATIENT
Start: 2021-04-14 | End: 2021-04-15

## 2021-04-14 RX ORDER — OXYTOCIN-SODIUM CHLORIDE 0.9% IV SOLN 30 UNIT/500ML 30-0.9/5 UT/ML-%
2-20 SOLUTION INTRAVENOUS
Status: DISCONTINUED | OUTPATIENT
Start: 2021-04-14 | End: 2021-04-14

## 2021-04-14 RX ORDER — OXYTOCIN-SODIUM CHLORIDE 0.9% IV SOLN 30 UNIT/500ML 30-0.9/5 UT/ML-%
85 SOLUTION INTRAVENOUS ONCE
Status: CANCELLED | OUTPATIENT
Start: 2021-04-14

## 2021-04-14 RX ORDER — LABETALOL HYDROCHLORIDE 5 MG/ML
20-80 INJECTION, SOLUTION INTRAVENOUS
Status: ACTIVE | OUTPATIENT
Start: 2021-04-14 | End: 2021-04-15

## 2021-04-14 RX ADMIN — BUTORPHANOL TARTRATE 1 MG: 1 INJECTION, SOLUTION INTRAMUSCULAR; INTRAVENOUS at 14:21

## 2021-04-14 RX ADMIN — BUTORPHANOL TARTRATE 1 MG: 1 INJECTION, SOLUTION INTRAMUSCULAR; INTRAVENOUS at 23:45

## 2021-04-14 RX ADMIN — SODIUM CHLORIDE, POTASSIUM CHLORIDE, SODIUM LACTATE AND CALCIUM CHLORIDE 125 ML/HR: 600; 310; 30; 20 INJECTION, SOLUTION INTRAVENOUS at 20:35

## 2021-04-14 RX ADMIN — SODIUM CHLORIDE 5 MILLION UNITS: 900 INJECTION INTRAVENOUS at 11:03

## 2021-04-14 RX ADMIN — PENICILLIN G 3 MILLION UNITS: 3000000 INJECTION, SOLUTION INTRAVENOUS at 14:17

## 2021-04-14 RX ADMIN — PENICILLIN G 3 MILLION UNITS: 3000000 INJECTION, SOLUTION INTRAVENOUS at 18:40

## 2021-04-14 RX ADMIN — OXYTOCIN 2 MILLI-UNITS/MIN: 10 INJECTION INTRAVENOUS at 22:24

## 2021-04-14 RX ADMIN — DOCUSATE SODIUM 100 MG: 100 CAPSULE, LIQUID FILLED ORAL at 12:28

## 2021-04-14 RX ADMIN — PENICILLIN G 3 MILLION UNITS: 3000000 INJECTION, SOLUTION INTRAVENOUS at 22:07

## 2021-04-14 RX ADMIN — SODIUM CHLORIDE, POTASSIUM CHLORIDE, SODIUM LACTATE AND CALCIUM CHLORIDE 100 ML/HR: 600; 310; 30; 20 INJECTION, SOLUTION INTRAVENOUS at 10:00

## 2021-04-14 NOTE — PROGRESS NOTES
2021  HD:1  20 y.o. yo female  at 36w1d    Diagnosis: Gestational Hypertension admitted for pre-eclampsia without severe features    Subjective   Shirley has not complaints today.  She is eating breakfast. She denies HA, Visual changes or RUQ pain.  Her BP's overnight have been elevated       Objective   Temp: Temp:  [97.8 °F (36.6 °C)-98.7 °F (37.1 °C)] 98.6 °F (37 °C) Temp src: Oral   BP: BP: (137-166)/(77-98) 156/94        Pulse: Heart Rate:  [] 90  RR: Resp:  [16-18] 16        NST:  REACTIVE  Category 1 FHT's:  130's-140's with accelerations    Lat Ultrasound was done on Monday by PDC. Showed 10-day lagging in growth        General:  nad   Abdomen: Gravid, nontender   Extremities DTR 2/4     Presentation: Not checked   Cervix: Exam by:     Dilation:     Effacement:     Station:           Labs:  Lab Results   Component Value Date    WBC 10.06 2021    HGB 10.2 (L) 2021    HCT 32.4 (L) 2021    MCV 85.3 2021     2021    URICACID 5.5 2021    AST 13 2021    ALT <5 2021     2021     Results from last 7 days   Lab Units 21  0101   ABO TYPING  A   RH TYPING  Positive   ANTIBODY SCREEN  Negative         Assessment  1.   20 y.o. yo female  at 36w1d  2.   Pre-eclampsia with now severe features  3.  Will have PDC see    Plan   21   1. 36 1/7 weeks IUP  2. Pre-eclampsia with severe features  3. Await PDC recommendations  4. I would recommend induction and delivery    I spent 15 minutes with this patient of which greater than 50% was face to face counseling and coordination of care.  All questions were answered to the best of my ablity.         This note has been electronically signed.    Felisha Dinh DO  11:22 EDT  21

## 2021-04-14 NOTE — PROGRESS NOTES
Maternal-Fetal Medicine   Progress Note    Patient name: Shirley Mills  YOB: 2000   MRN: [id number]  Admission Date: 2021  Date of Service: 2021  Referring Provider: Kyara Lino       Shirley Mills is a 20 y.o.    at 36w1d  admitted on 2021 for Gestational HTN    Hospital day 1    Chief Complaint   Patient presents with   • Elevated Blood Pressure       Diagnoses:   Patient Active Problem List   Diagnosis   • Fatigue during pregnancy in third trimester   • Gestational HTN       Subjective:      Shirley has complaints today of increased edema.   Patient denies  headache:   Patient denies  right upper quadrant pain:   Reports fetal movement is normal  Denies leakage of amniotic fluid.  Denies vaginal bleeding    Objective:     Vital signs:  Temp:  [97.8 °F (36.6 °C)-98.7 °F (37.1 °C)] 98.2 °F (36.8 °C)  Heart Rate:  [72-83] 72  Resp:  [16-18] 18  BP: (137-161)/(77-93) 142/77    Abdomen: soft, nontender, slightly obese  Uterus: gravid, nontender  Extremities: nontender; 2+ edema.  DTRs are 4+ with 2 beats of clonus    Fetal heart rate tracing review  FHT's: Category 1  TOCO: irregular    Cervix: last check      Most recent ultrasound:  Monday at the [facility] revealing a 10-day lagging growth    Medications:  docusate sodium, 100 mg, Oral, BID       •  acetaminophen    Labs:  Lab Results   Component Value Date    HGB 10.2 (L) 2021     Lab Results   Component Value Date    GLUCOSE 122 (H) 2021         Assessment/Plan:      Shirley is a 20 y.o.    at 36w1d.  1.  Preeclampsia, now with severe features based on 2 blood pressures greater than 160 mmHg  2.  10-day lag in fetal growth based on ultrasound at the [facility] on 2021  3.  Reactive nonstress test  4.  BMI 38    Plan:   1.  Given the development of severe features based on severe range systolic blood pressure, would advocate initiation of magnesium sulfate and proceed with delivery  2.  As needed  antihypertensive therapy antepartum for blood pressures persistently greater than 160/110  3.  Notify NICU of indicated  delivery  4.  I discussed the clinical situation with Dr. Lino by phone and she is in agreement    I spent 15 minutes with this patient of which greater than 50% was face to face counseling and coordination of care.  All questions were answered to the best of my ablity.    Samy Villarreal MD  2021  08:59 EDT

## 2021-04-14 NOTE — PROGRESS NOTES
20 y.o.  at 36w  OB History        1    Para   0    Term   0       0    AB   0    Living   0       SAB   0    TAB   0    Ectopic   0    Molar   0    Multiple   0    Live Births   0             Presents as an induction of labor due to severe preeclampsia and unfavorable cervix  Her primary OB requests a Sol Bulb placement to initiate the induction of labor.    Fetal Heart Rate Assessment   Method: Fetal HR Assessment Method: external   Beats/min: Fetal HR (beats/min): 140   Baseline: Fetal Heart Baseline Rate: normal range   Varibility: Fetal HR Variability: moderate (amplitude range 6 to 25 bpm)   Accels: Fetal HR Accelerations: greater than/equal to 15 bpm, lasting at least 15 seconds   Decels: Fetal HR Decelerations: absent   Tracing Category:       TOCO  SVE 1/60/-2    A Sol Bulb was placed without difficulties with 60 mL of sterile saline.  The patient tolerated the procedure well.

## 2021-04-14 NOTE — PROGRESS NOTES
Discussed with PDC  They recommend induction  Will start with a posey bulb and augment with pitocin  Start Magnesium sulfate

## 2021-04-15 ENCOUNTER — ANESTHESIA EVENT (OUTPATIENT)
Dept: LABOR AND DELIVERY | Facility: HOSPITAL | Age: 21
End: 2021-04-15

## 2021-04-15 ENCOUNTER — ANESTHESIA (OUTPATIENT)
Dept: LABOR AND DELIVERY | Facility: HOSPITAL | Age: 21
End: 2021-04-15

## 2021-04-15 LAB
ALBUMIN SERPL-MCNC: 2.6 G/DL (ref 3.5–5.2)
ALBUMIN/GLOB SERPL: 1.1 G/DL
ALP SERPL-CCNC: 115 U/L (ref 39–117)
ALT SERPL W P-5'-P-CCNC: 5 U/L (ref 1–33)
ANION GAP SERPL CALCULATED.3IONS-SCNC: 12 MMOL/L (ref 5–15)
AST SERPL-CCNC: 20 U/L (ref 1–32)
ATMOSPHERIC PRESS: ABNORMAL MM[HG]
ATMOSPHERIC PRESS: ABNORMAL MM[HG]
BASE EXCESS BLDCOA CALC-SCNC: -10.7 MMOL/L (ref 0–2)
BASE EXCESS BLDCOV CALC-SCNC: -6.6 MMOL/L (ref 0–2)
BDY SITE: ABNORMAL
BDY SITE: ABNORMAL
BILIRUB SERPL-MCNC: 0.3 MG/DL (ref 0–1.2)
BODY TEMPERATURE: 37 C
BODY TEMPERATURE: 37 C
BUN SERPL-MCNC: 5 MG/DL (ref 6–20)
BUN/CREAT SERPL: 7.4 (ref 7–25)
CALCIUM SPEC-SCNC: 8 MG/DL (ref 8.6–10.5)
CHLORIDE SERPL-SCNC: 104 MMOL/L (ref 98–107)
CO2 BLDA-SCNC: 21.3 MMOL/L (ref 22–33)
CO2 BLDA-SCNC: 25 MMOL/L (ref 22–33)
CO2 SERPL-SCNC: 18 MMOL/L (ref 22–29)
COLLECT TME SMN: ABNORMAL
CREAT SERPL-MCNC: 0.68 MG/DL (ref 0.57–1)
DEPRECATED RDW RBC AUTO: 49.4 FL (ref 37–54)
EPAP: 0
EPAP: 0
ERYTHROCYTE [DISTWIDTH] IN BLOOD BY AUTOMATED COUNT: 16.1 % (ref 12.3–15.4)
GFR SERPL CREATININE-BSD FRML MDRD: 111 ML/MIN/1.73
GFR SERPL CREATININE-BSD FRML MDRD: 135 ML/MIN/1.73
GLOBULIN UR ELPH-MCNC: 2.3 GM/DL
GLUCOSE SERPL-MCNC: 203 MG/DL (ref 65–99)
HCO3 BLDCOA-SCNC: 22.4 MMOL/L (ref 16.9–20.5)
HCO3 BLDCOV-SCNC: 20 MMOL/L (ref 18.6–21.4)
HCT VFR BLD AUTO: 33.4 % (ref 34–46.6)
HGB BLD-MCNC: 10.7 G/DL (ref 12–15.9)
HGB BLDA-MCNC: 17.2 G/DL (ref 14–18)
HGB BLDA-MCNC: 19.7 G/DL (ref 14–18)
INHALED O2 CONCENTRATION: 21 %
INHALED O2 CONCENTRATION: 21 %
IPAP: 0
IPAP: 0
Lab: ABNORMAL
MAGNESIUM SERPL-MCNC: 3.6 MG/DL (ref 1.7–2.2)
MCH RBC QN AUTO: 26.9 PG (ref 26.6–33)
MCHC RBC AUTO-ENTMCNC: 32 G/DL (ref 31.5–35.7)
MCV RBC AUTO: 83.9 FL (ref 79–97)
MODALITY: ABNORMAL
MODALITY: ABNORMAL
NOTE: ABNORMAL
NOTE: ABNORMAL
NOTIFIED BY: ABNORMAL
NOTIFIED WHO: ABNORMAL
PAW @ PEAK INSP FLOW SETTING VENT: 0 CMH2O
PAW @ PEAK INSP FLOW SETTING VENT: 0 CMH2O
PCO2 BLDCOA: 83.5 MMHG (ref 43.3–54.9)
PCO2 BLDCOV: 42.6 MM HG (ref 28–40)
PH BLDCOA: 7.04 PH UNITS (ref 7.22–7.3)
PH BLDCOV: 7.28 PH UNITS (ref 7.31–7.37)
PLATELET # BLD AUTO: 267 10*3/MM3 (ref 140–450)
PMV BLD AUTO: 10.2 FL (ref 6–12)
PO2 BLDCOA: 16 MMHG (ref 11.5–43.3)
PO2 BLDCOV: 30.9 MM HG (ref 21–31)
POTASSIUM SERPL-SCNC: 4.3 MMOL/L (ref 3.5–5.2)
PROT SERPL-MCNC: 4.9 G/DL (ref 6–8.5)
RBC # BLD AUTO: 3.98 10*6/MM3 (ref 3.77–5.28)
SAO2 % BLDCOA: 18.7 %
SAO2 % BLDCOA: ABNORMAL %
SAO2 % BLDCOV: 66.6 %
SODIUM SERPL-SCNC: 134 MMOL/L (ref 136–145)
TOTAL RATE: 0 BREATHS/MINUTE
TOTAL RATE: 0 BREATHS/MINUTE
VENTILATOR MODE: ABNORMAL
WBC # BLD AUTO: 22.99 10*3/MM3 (ref 3.4–10.8)

## 2021-04-15 PROCEDURE — 83735 ASSAY OF MAGNESIUM: CPT | Performed by: OBSTETRICS & GYNECOLOGY

## 2021-04-15 PROCEDURE — 51703 INSERT BLADDER CATH COMPLEX: CPT

## 2021-04-15 PROCEDURE — 80053 COMPREHEN METABOLIC PANEL: CPT | Performed by: OBSTETRICS & GYNECOLOGY

## 2021-04-15 PROCEDURE — 25010000002 BUTORPHANOL PER 1 MG: Performed by: ADVANCED PRACTICE MIDWIFE

## 2021-04-15 PROCEDURE — 25010000002 ROPIVACAINE PER 1 MG: Performed by: NURSE ANESTHETIST, CERTIFIED REGISTERED

## 2021-04-15 PROCEDURE — C1755 CATHETER, INTRASPINAL: HCPCS

## 2021-04-15 PROCEDURE — 85027 COMPLETE CBC AUTOMATED: CPT | Performed by: OBSTETRICS & GYNECOLOGY

## 2021-04-15 PROCEDURE — 82805 BLOOD GASES W/O2 SATURATION: CPT

## 2021-04-15 PROCEDURE — 25010000002 PENICILLIN G POTASSIUM PER 600000 UNITS: Performed by: ADVANCED PRACTICE MIDWIFE

## 2021-04-15 PROCEDURE — C1755 CATHETER, INTRASPINAL: HCPCS | Performed by: ANESTHESIOLOGY

## 2021-04-15 PROCEDURE — 25010000003 MAGNESIUM SULFATE 20 GM/500ML SOLUTION: Performed by: ADVANCED PRACTICE MIDWIFE

## 2021-04-15 PROCEDURE — 25010000002 FENTANYL CITRATE (PF) 100 MCG/2ML SOLUTION: Performed by: NURSE ANESTHETIST, CERTIFIED REGISTERED

## 2021-04-15 PROCEDURE — 88307 TISSUE EXAM BY PATHOLOGIST: CPT | Performed by: ADVANCED PRACTICE MIDWIFE

## 2021-04-15 PROCEDURE — 25010000002 ONDANSETRON PER 1 MG: Performed by: ADVANCED PRACTICE MIDWIFE

## 2021-04-15 PROCEDURE — 0HQ9XZZ REPAIR PERINEUM SKIN, EXTERNAL APPROACH: ICD-10-PCS | Performed by: ADVANCED PRACTICE MIDWIFE

## 2021-04-15 PROCEDURE — 25010000002 FUROSEMIDE PER 20 MG: Performed by: OBSTETRICS & GYNECOLOGY

## 2021-04-15 RX ORDER — IBUPROFEN 600 MG/1
600 TABLET ORAL EVERY 6 HOURS PRN
Status: DISCONTINUED | OUTPATIENT
Start: 2021-04-15 | End: 2021-04-17 | Stop reason: HOSPADM

## 2021-04-15 RX ORDER — METOCLOPRAMIDE HYDROCHLORIDE 5 MG/ML
10 INJECTION INTRAMUSCULAR; INTRAVENOUS ONCE AS NEEDED
Status: DISCONTINUED | OUTPATIENT
Start: 2021-04-15 | End: 2021-04-16

## 2021-04-15 RX ORDER — EPHEDRINE SULFATE 5 MG/ML
10 INJECTION INTRAVENOUS
Status: DISCONTINUED | OUTPATIENT
Start: 2021-04-15 | End: 2021-04-16

## 2021-04-15 RX ORDER — HYDROCORTISONE 25 MG/G
1 CREAM TOPICAL AS NEEDED
Status: DISCONTINUED | OUTPATIENT
Start: 2021-04-15 | End: 2021-04-17 | Stop reason: HOSPADM

## 2021-04-15 RX ORDER — ONDANSETRON 2 MG/ML
4 INJECTION INTRAMUSCULAR; INTRAVENOUS ONCE AS NEEDED
Status: DISCONTINUED | OUTPATIENT
Start: 2021-04-15 | End: 2021-04-16

## 2021-04-15 RX ORDER — ROPIVACAINE HYDROCHLORIDE 2 MG/ML
15 INJECTION, SOLUTION EPIDURAL; INFILTRATION; PERINEURAL CONTINUOUS
Status: DISCONTINUED | OUTPATIENT
Start: 2021-04-15 | End: 2021-04-16

## 2021-04-15 RX ORDER — FENTANYL CITRATE 50 UG/ML
INJECTION, SOLUTION INTRAMUSCULAR; INTRAVENOUS AS NEEDED
Status: DISCONTINUED | OUTPATIENT
Start: 2021-04-15 | End: 2021-04-15 | Stop reason: SURG

## 2021-04-15 RX ORDER — DIPHENHYDRAMINE HYDROCHLORIDE 50 MG/ML
12.5 INJECTION INTRAMUSCULAR; INTRAVENOUS EVERY 8 HOURS PRN
Status: DISCONTINUED | OUTPATIENT
Start: 2021-04-15 | End: 2021-04-16

## 2021-04-15 RX ORDER — LIDOCAINE HYDROCHLORIDE AND EPINEPHRINE 15; 5 MG/ML; UG/ML
INJECTION, SOLUTION EPIDURAL AS NEEDED
Status: DISCONTINUED | OUTPATIENT
Start: 2021-04-15 | End: 2021-04-15 | Stop reason: SURG

## 2021-04-15 RX ORDER — FUROSEMIDE 10 MG/ML
20 INJECTION INTRAMUSCULAR; INTRAVENOUS EVERY 4 HOURS
Status: DISCONTINUED | OUTPATIENT
Start: 2021-04-15 | End: 2021-04-16

## 2021-04-15 RX ORDER — FERROUS SULFATE 325(65) MG
325 TABLET ORAL 2 TIMES DAILY WITH MEALS
Status: DISCONTINUED | OUTPATIENT
Start: 2021-04-15 | End: 2021-04-17 | Stop reason: HOSPADM

## 2021-04-15 RX ORDER — LANOLIN
CREAM (ML) TOPICAL
Status: DISCONTINUED | OUTPATIENT
Start: 2021-04-15 | End: 2021-04-17 | Stop reason: HOSPADM

## 2021-04-15 RX ORDER — LABETALOL 200 MG/1
100 TABLET, FILM COATED ORAL EVERY 8 HOURS SCHEDULED
Status: DISCONTINUED | OUTPATIENT
Start: 2021-04-15 | End: 2021-04-17

## 2021-04-15 RX ORDER — PRENATAL VIT/IRON FUM/FOLIC AC 27MG-0.8MG
1 TABLET ORAL DAILY
Status: DISCONTINUED | OUTPATIENT
Start: 2021-04-15 | End: 2021-04-17 | Stop reason: HOSPADM

## 2021-04-15 RX ORDER — ACETAMINOPHEN 325 MG/1
650 TABLET ORAL EVERY 4 HOURS PRN
Status: DISCONTINUED | OUTPATIENT
Start: 2021-04-15 | End: 2021-04-17 | Stop reason: HOSPADM

## 2021-04-15 RX ORDER — FAMOTIDINE 10 MG/ML
20 INJECTION, SOLUTION INTRAVENOUS ONCE AS NEEDED
Status: DISCONTINUED | OUTPATIENT
Start: 2021-04-15 | End: 2021-04-16

## 2021-04-15 RX ORDER — DOCUSATE SODIUM 100 MG/1
100 CAPSULE, LIQUID FILLED ORAL 2 TIMES DAILY
Status: DISCONTINUED | OUTPATIENT
Start: 2021-04-15 | End: 2021-04-17 | Stop reason: HOSPADM

## 2021-04-15 RX ORDER — TRISODIUM CITRATE DIHYDRATE AND CITRIC ACID MONOHYDRATE 500; 334 MG/5ML; MG/5ML
30 SOLUTION ORAL ONCE
Status: DISCONTINUED | OUTPATIENT
Start: 2021-04-15 | End: 2021-04-16

## 2021-04-15 RX ADMIN — LIDOCAINE HYDROCHLORIDE AND EPINEPHRINE 2 ML: 15; 5 INJECTION, SOLUTION EPIDURAL at 08:37

## 2021-04-15 RX ADMIN — PENICILLIN G 3 MILLION UNITS: 3000000 INJECTION, SOLUTION INTRAVENOUS at 06:03

## 2021-04-15 RX ADMIN — MAGNESIUM SULFATE IN WATER 1 G/HR: 40 INJECTION, SOLUTION INTRAVENOUS at 02:09

## 2021-04-15 RX ADMIN — SODIUM CHLORIDE, POTASSIUM CHLORIDE, SODIUM LACTATE AND CALCIUM CHLORIDE 25 ML/HR: 600; 310; 30; 20 INJECTION, SOLUTION INTRAVENOUS at 21:40

## 2021-04-15 RX ADMIN — SODIUM CHLORIDE, POTASSIUM CHLORIDE, SODIUM LACTATE AND CALCIUM CHLORIDE 125 ML/HR: 600; 310; 30; 20 INJECTION, SOLUTION INTRAVENOUS at 07:31

## 2021-04-15 RX ADMIN — LIDOCAINE HYDROCHLORIDE AND EPINEPHRINE 3 ML: 15; 5 INJECTION, SOLUTION EPIDURAL at 08:34

## 2021-04-15 RX ADMIN — BUTORPHANOL TARTRATE 2 MG: 2 INJECTION, SOLUTION INTRAMUSCULAR; INTRAVENOUS at 06:02

## 2021-04-15 RX ADMIN — FUROSEMIDE 20 MG: 10 INJECTION, SOLUTION INTRAVENOUS at 22:32

## 2021-04-15 RX ADMIN — Medication: at 18:00

## 2021-04-15 RX ADMIN — IBUPROFEN 600 MG: 600 TABLET ORAL at 17:58

## 2021-04-15 RX ADMIN — LABETALOL HYDROCHLORIDE 100 MG: 100 TABLET, FILM COATED ORAL at 18:49

## 2021-04-15 RX ADMIN — FENTANYL CITRATE 100 MCG: 50 INJECTION, SOLUTION INTRAMUSCULAR; INTRAVENOUS at 08:37

## 2021-04-15 RX ADMIN — ROPIVACAINE HYDROCHLORIDE 10 ML: 5 INJECTION, SOLUTION EPIDURAL; INFILTRATION; PERINEURAL at 08:40

## 2021-04-15 RX ADMIN — PENICILLIN G 3 MILLION UNITS: 3000000 INJECTION, SOLUTION INTRAVENOUS at 02:08

## 2021-04-15 RX ADMIN — DOCUSATE SODIUM 100 MG: 100 CAPSULE, LIQUID FILLED ORAL at 22:04

## 2021-04-15 RX ADMIN — ROPIVACAINE HYDROCHLORIDE 15 ML/HR: 2 INJECTION, SOLUTION EPIDURAL; INFILTRATION at 08:41

## 2021-04-15 RX ADMIN — ONDANSETRON 4 MG: 2 INJECTION INTRAMUSCULAR; INTRAVENOUS at 02:29

## 2021-04-15 RX ADMIN — FAMOTIDINE 20 MG: 10 INJECTION, SOLUTION INTRAVENOUS at 22:04

## 2021-04-15 RX ADMIN — PENICILLIN G 3 MILLION UNITS: 3000000 INJECTION, SOLUTION INTRAVENOUS at 09:47

## 2021-04-15 NOTE — ANESTHESIA PREPROCEDURE EVALUATION
Anesthesia Evaluation     Patient summary reviewed and Nursing notes reviewed   NPO Solid Status: > 6 hours  NPO Liquid Status: < 2 hours           Airway   Mallampati: II  TM distance: >3 FB  Neck ROM: full  Dental      Pulmonary    (+) asthma,  Cardiovascular     (+) hypertension,       Neuro/Psych- negative ROS  GI/Hepatic/Renal/Endo - negative ROS     Musculoskeletal (-) negative ROS    Abdominal    Substance History - negative use     OB/GYN    (+) Pregnant, Preeclampsia, pregnancy induced hypertension        Other - negative ROS                       Anesthesia Plan    ASA 3     epidural       Anesthetic plan, all risks, benefits, and alternatives have been provided, discussed and informed consent has been obtained with: patient.    Plan discussed with attending.

## 2021-04-15 NOTE — ANESTHESIA PROCEDURE NOTES
Labor Epidural      Patient reassessed immediately prior to procedure    Patient location during procedure: floor  Performed By  CRNA: Rebeca Herrera CRNA  Preanesthetic Checklist  Completed: patient identified, IV checked, risks and benefits discussed, surgical consent, monitors and equipment checked, pre-op evaluation and timeout performed  Prep:  Pt Position:sitting  Sterile Tech:cap, gloves, mask and sterile barrier  Prep:DuraPrep  Monitoring:blood pressure monitoring  Epidural Block Procedure:  Approach:midline  Guidance:palpation technique  Location:L4-L5  Needle Type:Tuohy  Needle Gauge:17 G  Loss of Resistance Medium: air  Loss of Resistance: 6cm  Cath Depth at skin:11 cm  Aspiration:negative  Test Dose:negative  Number of Attempts: 1  Post Assessment:  Dressing:occlusive dressing applied and secured with tape  Pt Tolerance:patient tolerated the procedure well with no apparent complications  Complications:no

## 2021-04-15 NOTE — L&D DELIVERY NOTE
[facility]  Vaginal Delivery Note    Delivery     Delivery: Vaginal, Spontaneous     YOB: 2020    Time of Birth:  Gestational Age 1:32 PM   36w2d     Anesthesia: Epidural     Delivering clinician: Sara Valiente CNBRADLEY   Forceps?   No   Vacuum? No    Shoulder dystocia present: No        Delivery narrative:  Shirley pushed to deliver a viable female infant in OA position, over intact perineum, with working epidural.  Mouth and nose bulb suctioned after delivery of head. Nuchal cord x1, baby delivered through with flip. Infant floppy, placed on mother abdomen and stimulated while cord was double clamped and cut.  Infant handed off to delivery room team for management. Cord,gasses, cord blood and cord segment obtained.  Spontaneous delivery of intact placenta with 3 vessel cord.  Fundus firm.  Lochia normal rubra.  Perineal and vaginal inspection revealed a 1st degree laceration that was repaired to hemostasis.  Mother and daughter stable in the immediate PP period.    Infant    Findings: female  infant     Infant observations: Weight: No birth weight on file.   Length:   in  Observations/Comments:        Apgars:   @ 1 minute /      @ 5 minutes   Infant Name: Raiyn     Placenta, Cord, and Fluid    Placenta delivered  Spontaneous  at   4/15/2021  1:35 PM     Cord: 3 vessels  present.   Nuchal Cord?  yes; Number of nuchal loops present:  1    Cord blood obtained: Yes    Cord gases obtained:  Yes    Cord gas results: Venous:  No results found for: PHCVEN    Arterial:  No results found for: PHCART     Repair    Episiotomy: None     No    Lacerations: Yes  Laceration Information  Laceration Repaired?   Perineal: 1st  Yes    Periurethral:       Labial:       Sulcus:       Vaginal:       Cervical:         Suture used for repair: 3-0 chromic gut   Estimated Blood Loss: Est. Blood Loss (mL): 300 mL (Filed from Delivery Summary) (04/15/21 2262)           Complications  none    Disposition  Mother to Antepartum  unit  in stable condition currently.  Baby to NICU  in stable condition currently.      Sara Valiente CNM  04/15/21  14:14 EDT

## 2021-04-15 NOTE — PROGRESS NOTES
[facility]  Obstetric Progress Note    Subjective     Patient:    Feeling intense contractions. Denies lof or vaginal bleeding. Active FM. Pitocin infusing. Desires epidural.     Objective     Vital Signs Range for the last 24 hours  Temp:  [97.9 °F (36.6 °C)-98.6 °F (37 °C)] 98.1 °F (36.7 °C)   Temp src: Oral   BP: (126-166)/(71-98) 135/85   Heart Rate:  [] 101   Resp:  [15-18] 16   SpO2:  [98 %-100 %] 99 %       Device (Oxygen Therapy): room air           Intake/Output this shift:    No intake/output data recorded.    Physical Exam:      Abdomen Abdominal exam: soft, nontender, nondistended, no masses or organomegaly.   Extremities Exam of extremities: peripheral pulses normal, no pedal edema, no clubbing or cyanosis, pedal edema 2-3 +     Presentation: cephalic   Cervix: Exam by: Method: sterile exam per RN   Dilation:  5   Effacement: 80%   Station: -1         Fetal Heart Rate Assessment   Method: Fetal HR Assessment Method: external   Beats/min: Fetal HR (beats/min): 130   Baseline: Fetal Heart Baseline Rate: normal range   Varibility: Fetal HR Variability: minimal (detectable, amplitude less than or equal to 5 bpm)   Accels: Fetal HR Accelerations: greater than/equal to 15 bpm, lasting at least 15 seconds   Decels: Fetal HR Decelerations: absent   Tracing Category:       Uterine Assessment   Method: Method: external tocotransducer   Frequency (min): Contraction Frequency (Minutes): 1-3   Ctx Count in 10 min:     Duration:     Intensity: Contraction Intensity: moderate by palpation   Intensity by IUPC:     Resting Tone: Uterine Resting Tone: soft by palpation   Resting Tone by IUPC:     [place] Units:         Assessment/Plan       Severe preeclampsia, third trimester    Gestational HTN        Assessment:  1.  Intrauterine pregnancy at 36w2d weeks gestation with reactive fetal status.    2.  IOL for preEclampsia  3. Cx 5/80/-1. Aromd-clear fluid  3.  Obstetrical history significant for is  non-contributory.      Plan:  1. Continue observation  2. Repeat SVE every 2-4 hours or prn  3. Consult anesthesia for epidural   4.  Plan of care has been reviewed with patient and family  5.  Risks, benefits of treatment plan have been discussed.  6.  All questions have been answered.  7. Continue to monitor Bps closely      Megan Ge CNM  4/15/2021  08:10 EDT

## 2021-04-16 LAB
BASOPHILS # BLD AUTO: 0.09 10*3/MM3 (ref 0–0.2)
BASOPHILS NFR BLD AUTO: 0.5 % (ref 0–1.5)
DEPRECATED RDW RBC AUTO: 54.3 FL (ref 37–54)
EOSINOPHIL # BLD AUTO: 0.22 10*3/MM3 (ref 0–0.4)
EOSINOPHIL NFR BLD AUTO: 1.1 % (ref 0.3–6.2)
ERYTHROCYTE [DISTWIDTH] IN BLOOD BY AUTOMATED COUNT: 16.2 % (ref 12.3–15.4)
HCT VFR BLD AUTO: 32.7 % (ref 34–46.6)
HGB BLD-MCNC: 9.8 G/DL (ref 12–15.9)
IMM GRANULOCYTES # BLD AUTO: 0.12 10*3/MM3 (ref 0–0.05)
IMM GRANULOCYTES NFR BLD AUTO: 0.6 % (ref 0–0.5)
LYMPHOCYTES # BLD AUTO: 1.25 10*3/MM3 (ref 0.7–3.1)
LYMPHOCYTES NFR BLD AUTO: 6.5 % (ref 19.6–45.3)
MCH RBC QN AUTO: 27.1 PG (ref 26.6–33)
MCHC RBC AUTO-ENTMCNC: 30 G/DL (ref 31.5–35.7)
MCV RBC AUTO: 90.6 FL (ref 79–97)
MONOCYTES # BLD AUTO: 1.21 10*3/MM3 (ref 0.1–0.9)
MONOCYTES NFR BLD AUTO: 6.3 % (ref 5–12)
NEUTROPHILS NFR BLD AUTO: 16.34 10*3/MM3 (ref 1.7–7)
NEUTROPHILS NFR BLD AUTO: 85 % (ref 42.7–76)
NRBC BLD AUTO-RTO: 0 /100 WBC (ref 0–0.2)
PLATELET # BLD AUTO: 239 10*3/MM3 (ref 140–450)
PMV BLD AUTO: 10.1 FL (ref 6–12)
RBC # BLD AUTO: 3.61 10*6/MM3 (ref 3.77–5.28)
WBC # BLD AUTO: 19.23 10*3/MM3 (ref 3.4–10.8)

## 2021-04-16 PROCEDURE — 85025 COMPLETE CBC W/AUTO DIFF WBC: CPT | Performed by: ADVANCED PRACTICE MIDWIFE

## 2021-04-16 PROCEDURE — 25010000003 MAGNESIUM SULFATE 20 GM/500ML SOLUTION: Performed by: ADVANCED PRACTICE MIDWIFE

## 2021-04-16 PROCEDURE — 25010000002 FUROSEMIDE PER 20 MG: Performed by: OBSTETRICS & GYNECOLOGY

## 2021-04-16 RX ORDER — MAGNESIUM SULFATE HEPTAHYDRATE 40 MG/ML
1 INJECTION, SOLUTION INTRAVENOUS CONTINUOUS
Status: DISCONTINUED | OUTPATIENT
Start: 2021-04-16 | End: 2021-04-16

## 2021-04-16 RX ADMIN — IBUPROFEN 600 MG: 600 TABLET ORAL at 11:28

## 2021-04-16 RX ADMIN — FUROSEMIDE 20 MG: 10 INJECTION, SOLUTION INTRAVENOUS at 02:41

## 2021-04-16 RX ADMIN — LABETALOL HYDROCHLORIDE 100 MG: 100 TABLET, FILM COATED ORAL at 20:18

## 2021-04-16 RX ADMIN — FUROSEMIDE 20 MG: 10 INJECTION, SOLUTION INTRAVENOUS at 08:00

## 2021-04-16 RX ADMIN — FERROUS SULFATE TAB 325 MG (65 MG ELEMENTAL FE) 325 MG: 325 (65 FE) TAB at 18:30

## 2021-04-16 RX ADMIN — LABETALOL HYDROCHLORIDE 100 MG: 100 TABLET, FILM COATED ORAL at 07:00

## 2021-04-16 RX ADMIN — DOCUSATE SODIUM 100 MG: 100 CAPSULE, LIQUID FILLED ORAL at 20:18

## 2021-04-16 RX ADMIN — IBUPROFEN 600 MG: 600 TABLET ORAL at 18:30

## 2021-04-16 RX ADMIN — LABETALOL HYDROCHLORIDE 100 MG: 100 TABLET, FILM COATED ORAL at 14:01

## 2021-04-16 RX ADMIN — MAGNESIUM SULFATE IN WATER 1 G/HR: 40 INJECTION, SOLUTION INTRAVENOUS at 01:34

## 2021-04-16 RX ADMIN — DOCUSATE SODIUM 100 MG: 100 CAPSULE, LIQUID FILLED ORAL at 08:34

## 2021-04-16 RX ADMIN — FERROUS SULFATE TAB 325 MG (65 MG ELEMENTAL FE) 325 MG: 325 (65 FE) TAB at 08:34

## 2021-04-16 RX ADMIN — ACETAMINOPHEN 650 MG: 325 TABLET ORAL at 18:30

## 2021-04-16 RX ADMIN — Medication 1 APPLICATION: at 08:34

## 2021-04-16 NOTE — PROGRESS NOTES
4/16/2021  PPD #1    Subjective   Shirley feels well.  Doing much better. Urinating with Lasix. Denies HA, visual changes or RUQ pain  Patient describes her lochia as less than menses.  Pain is well controlled       Objective   Temp: Temp:  [98.1 °F (36.7 °C)-98.8 °F (37.1 °C)] 98.8 °F (37.1 °C) Temp src: Oral   BP: BP: (125-159)/() 135/85        Pulse: Heart Rate:  [] 93  RR: Resp:  [16-18] 16    General:  No acute distress   Abdomen: Fundus firm and beneath umbilicus   Pelvis: deferred     Lab Results   Component Value Date    WBC 19.23 (H) 04/16/2021    HGB 9.8 (L) 04/16/2021    HCT 32.7 (L) 04/16/2021    MCV 90.6 04/16/2021     04/16/2021    URICACID 5.5 04/14/2021    AST 20 04/15/2021    ALT 5 04/15/2021     04/14/2021    HEPBSAG Non-Reactive 10/30/2020     Results from last 7 days   Lab Units 04/12/21  0101   ABO TYPING  A   RH TYPING  Positive   ANTIBODY SCREEN  Negative       Assessment  1. PPD# 1 after vaginal delivery  2. Urinating with Lasix  3. PEP normal  4. BP's normal    Plan  1.  Will DC magnesium sulfate  2.  Will hold her next dose of lasix to see if she can urinate without it  3.  Possibly move to mother baby later this evening  4.  Continue to watch closely      This note has been electronically signed.    Felisha Dinh DO  09:09 EDT  April 16, 2021

## 2021-04-16 NOTE — LACTATION NOTE
04/16/21 1030   Maternal Information   Date of Referral 04/16/21   Person Making Referral other (see comments)  (courtesy)   Maternal Assessment   Breast Size Issue none   Breast Shape Bilateral:;round   Breast Density Bilateral:;soft   Nipples Bilateral:;everted;graspable   Maternal Infant Feeding   Maternal Emotional State receptive;relaxed   Infant Positioning clutch/football   Pain with Feeding no   Comfort Measures Before/During Feeding latch adjusted;infant position adjusted   Latch Assistance full assistance needed   Milk Expression/Equipment   Breast Pump Type double electric, hospital grade;double electric, personal     Discussed nursing/pumping/supplementing every 3 hrs. Assisted baby to left breast with shield using formula on shield. Baby latched and has a good suck, showed parents how to keep baby latched. Instructed on breast compression and how to properly use shield. Enc mom to pump for 15 min after BF while FOB feeds the formula via bottle. Discussed how to feed pumped colostrum via syringe. Enc to call for asst as needed.

## 2021-04-16 NOTE — PAYOR COMM NOTE
"Reji Mills (20 y.o. Female)     Date of Birth Social Security Number Address Home Phone MRN    2000  [id number]  [facility] [address] [phone] [id number]    Temple Marital Status          None Single       Admission Date Admission Type Admitting Provider Attending Provider Department, Room/Bed    21 Elective Kyara Lino MD [place], Iniko Z, CNM [facility] ANTEPARTUM, N327/    Discharge Date Discharge Disposition Discharge Destination                       Attending Provider: Jasmin Fuller CNM    Allergies: No Known Allergies    Isolation: None   Infection: None   Code Status: CPR    Ht: 162.6 cm (64\")   Wt: 102 kg (225 lb)    Admission Cmt: None   Principal Problem: Normal spontaneous vaginal delivery [O80]                 Active Insurance as of 2021     Primary Coverage     Payor Plan Insurance Group Employer/Plan Group    [employer] [place]      Payor Plan Address Payor Plan Phone Number Payor Plan Fax Number Effective Dates    [address] [address] [phone]  10/30/2020 - None Entered    [facility] [id number]       Subscriber Name Subscriber Birth Date Member ID       REJI MILLS 2000 [id number]                 Emergency Contacts      (Rel.) Home Phone Work Phone Mobile Phone    HALEIGH JENKINS (Significant Other) -- -- [phone]    MELANI BATESE (Mother) [phone] -- --               Physician Progress Notes (last 48 hours) (Notes from 21 through 04/15/21 2019)      Aydin Workman,  at 21 1139          20 y.o.  at 36w  OB History        1    Para   0    Term   0       0    AB   0    Living   0       SAB   0    TAB   0    Ectopic   0    Molar   0    Multiple   0    Live Births   0             Presents as an induction of labor due to severe preeclampsia and unfavorable cervix  Her primary OB requests a Sol Bulb placement to initiate the induction of " labor.    Fetal Heart Rate Assessment   Method: Fetal HR Assessment Method: external   Beats/min: Fetal HR (beats/min): 140   Baseline: Fetal Heart Baseline Rate: normal range   Varibility: Fetal HR Variability: moderate (amplitude range 6 to 25 bpm)   Accels: Fetal HR Accelerations: greater than/equal to 15 bpm, lasting at least 15 seconds   Decels: Fetal HR Decelerations: absent   Tracing Category:       TOCO  SVE 1/60/-2    A Posey Bulb was placed without difficulties with 60 mL of sterile saline.  The patient tolerated the procedure well.    Electronically signed by Aydin Workman DO at 21 1140     Felisha Dinh DO at 21 1130        Discussed with PDC  They recommend induction  Will start with a posey bulb and augment with pitocin  Start Magnesium sulfate     Electronically signed by Felisha Dinh DO at 21 1131     Felisha Dinh DO at 21 1122          2021  HD:1  20 y.o. yo female  at 36w1d    Diagnosis: Gestational Hypertension admitted for pre-eclampsia without severe features    Subjective   Shirley has not complaints today.  She is eating breakfast. She denies HA, Visual changes or RUQ pain.  Her BP's overnight have been elevated       Objective   Temp: Temp:  [97.8 °F (36.6 °C)-98.7 °F (37.1 °C)] 98.6 °F (37 °C) Temp src: Oral   BP: BP: (137-166)/(77-98) 156/94        Pulse: Heart Rate:  [] 90  RR: Resp:  [16-18] 16        NST:  REACTIVE  Category 1 FHT's:  130's-140's with accelerations    Lat Ultrasound was done on Monday by PDC. Showed 10-day lagging in growth        General:  nad   Abdomen: Gravid, nontender   Extremities DTR 2/4     Presentation: Not checked   Cervix: Exam by:     Dilation:     Effacement:     Station:           Labs:  Lab Results   Component Value Date    WBC 10.06 2021    HGB 10.2 (L) 2021    HCT 32.4 (L) 2021    MCV 85.3 2021     2021    URICACID 5.5 2021    AST 13 2021    ALT <5  2021     2021     Results from last 7 days   Lab Units 21  0101   ABO TYPING  A   RH TYPING  Positive   ANTIBODY SCREEN  Negative         Assessment  1.   20 y.o. yo female  at 36w1d  2.   Pre-eclampsia with now severe features  3.  Will have PDC see    Plan   21   1. 36 1/7 weeks IUP  2. Pre-eclampsia with severe features  3. Await PDC recommendations  4. I would recommend induction and delivery    I spent 15 minutes with this patient of which greater than 50% was face to face counseling and coordination of care.  All questions were answered to the best of my ablity.         This note has been electronically signed.    Felisha Dinh DO  11:22 EDT  21      Electronically signed by Felisha Dinh DO at 21 1130     Samy Villarreal MD at 21 0858          Maternal-Fetal Medicine   Progress Note    Patient name: Shirley Mills  YOB: 2000   MRN: [id number]  Admission Date: 2021  Date of Service: 2021  Referring Provider: Kyara Lino       Shirley Mills is a 20 y.o.    at 36w1d  admitted on 2021 for Gestational HTN    Hospital day 1    Chief Complaint   Patient presents with   • Elevated Blood Pressure       Diagnoses:   Patient Active Problem List   Diagnosis   • Fatigue during pregnancy in third trimester   • Gestational HTN       Subjective:      Shirley has complaints today of increased edema.   Patient denies  headache:   Patient denies  right upper quadrant pain:   Reports fetal movement is normal  Denies leakage of amniotic fluid.  Denies vaginal bleeding    Objective:     Vital signs:  Temp:  [97.8 °F (36.6 °C)-98.7 °F (37.1 °C)] 98.2 °F (36.8 °C)  Heart Rate:  [72-83] 72  Resp:  [16-18] 18  BP: (137-161)/(77-93) 142/77    Abdomen: soft, nontender, slightly obese  Uterus: gravid, nontender  Extremities: nontender; 2+ edema.  DTRs are 4+ with 2 beats of clonus    Fetal heart rate tracing review  FHT's:  Category 1  TOCO: irregular    Cervix: last check      Most recent ultrasound:  Monday at the [facility] revealing a 10-day lagging growth    Medications:  docusate sodium, 100 mg, Oral, BID       •  acetaminophen    Labs:  Lab Results   Component Value Date    HGB 10.2 (L) 2021     Lab Results   Component Value Date    GLUCOSE 122 (H) 2021         Assessment/Plan:      Shirley is a 20 y.o.    at 36w1d.  1.  Preeclampsia, now with severe features based on 2 blood pressures greater than 160 mmHg  2.  10-day lag in fetal growth based on ultrasound at the [facility] on 2021  3.  Reactive nonstress test  4.  BMI 38    Plan:   1.  Given the development of severe features based on severe range systolic blood pressure, would advocate initiation of magnesium sulfate and proceed with delivery  2.  As needed antihypertensive therapy antepartum for blood pressures persistently greater than 160/110  3.  Notify NICU of indicated  delivery  4.  I discussed the clinical situation with Dr. Lino by phone and she is in agreement    I spent 15 minutes with this patient of which greater than 50% was face to face counseling and coordination of care.  All questions were answered to the best of my ablity.    Samy Villarreal MD  2021  08:59 EDT      Electronically signed by Samy Villarreal MD at 21 0906

## 2021-04-16 NOTE — LACTATION NOTE
04/16/21 1000   Maternal Information   Person Making Referral nurse;patient   Infant Reason for Referral 35-37 weeks gestation  (baby getting mostly formula)   Maternal Assessment   Breast Size Issue none   Breast Shape Bilateral:;round   Breast Density Bilateral:;soft   Nipples Bilateral:;short   Milk Expression/Equipment   Breast Pump Type double electric, hospital grade;double electric, personal  (demonstrated hospital pump/has personal pump at home)   Breast Pump Flange Type hard   Breast Pump Flange Size 24 mm   Breast Pumping   Breast Pumping Interventions post-feed pumping encouraged   Encouraged to pump every 3 hours to get best milk supply possible. Teaching done as documented under Education. To call lactation services, if there are [id number] or concerns.

## 2021-04-16 NOTE — ANESTHESIA POSTPROCEDURE EVALUATION
Patient: Shirley Mills    Procedure Summary     Date: 04/15/21 Room / Location:     Anesthesia Start: 0825 Anesthesia Stop: 1332    Procedure: LABOR ANALGESIA Diagnosis:     Scheduled Providers:  Provider: Yahir Barrow MD    Anesthesia Type: epidural ASA Status: 3          Anesthesia Type: epidural    Vitals  Vitals Value Taken Time   /87 04/16/21 1130   Temp 98.8 °F (37.1 °C) 04/16/21 0832   Pulse 93 04/16/21 1130   Resp 16 04/16/21 0919   SpO2 99 % 04/16/21 0919   Vitals shown include unvalidated device data.        Post Anesthesia Care and Evaluation    Patient location during evaluation: bedside  Patient participation: complete - patient participated  Level of consciousness: awake and alert  Pain management: adequate  Airway patency: patent  Anesthetic complications: No anesthetic complications    Cardiovascular status: acceptable  Respiratory status: acceptable  Hydration status: acceptable  Post Neuraxial Block status: Motor and sensory function returned to baseline and No signs or symptoms of PDPH

## 2021-04-16 NOTE — PLAN OF CARE
Problem: Breastfeeding  Goal: Effective Breastfeeding  Outcome: Ongoing, Progressing  Intervention: Promote Breast Care and Comfort  Flowsheets (Taken 4/16/2021 1030)  Breast Care: Breastfeeding: frequency of feeding adjusted  Intervention: Promote Effective Breastfeeding  Flowsheets (Taken 4/16/2021 1030)  Breastfeeding Assistance:   feeding on demand promoted   feeding cue recognition promoted  Parent/Child Attachment Promotion:   positive reinforcement provided   rooming-in promoted   skin-to-skin contact encouraged   participation in care promoted   parent/caregiver presence encouraged  Intervention: Support Exclusive Breastfeeding Success  Flowsheets (Taken 4/16/2021 1030)  Supportive Measures: positive reinforcement provided  Breastfeeding Support: lactation counseling provided   Goal Outcome Evaluation:

## 2021-04-17 VITALS
TEMPERATURE: 98 F | RESPIRATION RATE: 16 BRPM | OXYGEN SATURATION: 99 % | DIASTOLIC BLOOD PRESSURE: 81 MMHG | SYSTOLIC BLOOD PRESSURE: 133 MMHG | HEIGHT: 64 IN | WEIGHT: 230.6 LBS | BODY MASS INDEX: 39.37 KG/M2 | HEART RATE: 91 BPM

## 2021-04-17 RX ORDER — IBUPROFEN 600 MG/1
600 TABLET ORAL EVERY 6 HOURS PRN
Qty: 60 TABLET | Refills: 1 | Status: SHIPPED | OUTPATIENT
Start: 2021-04-17

## 2021-04-17 RX ORDER — LABETALOL 200 MG/1
100 TABLET, FILM COATED ORAL EVERY 8 HOURS SCHEDULED
Status: DISCONTINUED | OUTPATIENT
Start: 2021-04-17 | End: 2021-04-17 | Stop reason: HOSPADM

## 2021-04-17 RX ORDER — LABETALOL 100 MG/1
100 TABLET, FILM COATED ORAL EVERY 8 HOURS SCHEDULED
Qty: 60 TABLET | Refills: 0 | Status: SHIPPED | OUTPATIENT
Start: 2021-04-17

## 2021-04-17 RX ADMIN — IBUPROFEN 600 MG: 600 TABLET ORAL at 14:05

## 2021-04-17 RX ADMIN — IBUPROFEN 600 MG: 600 TABLET ORAL at 07:59

## 2021-04-17 RX ADMIN — DOCUSATE SODIUM 100 MG: 100 CAPSULE, LIQUID FILLED ORAL at 09:40

## 2021-04-17 RX ADMIN — LABETALOL HYDROCHLORIDE 100 MG: 200 TABLET, FILM COATED ORAL at 02:30

## 2021-04-17 RX ADMIN — FERROUS SULFATE TAB 325 MG (65 MG ELEMENTAL FE) 325 MG: 325 (65 FE) TAB at 07:59

## 2021-04-17 RX ADMIN — IBUPROFEN 600 MG: 600 TABLET ORAL at 02:30

## 2021-04-17 RX ADMIN — LABETALOL HYDROCHLORIDE 100 MG: 200 TABLET, FILM COATED ORAL at 09:40

## 2021-04-17 NOTE — DISCHARGE SUMMARY
[facility]  Vaginal Delivery Discharge Summary      Patient: Shirley Mills      MR#:[id number]  Admission  Diagnosis: term pregnancy  Discharge Diagnosis: vaginal delivery    Date of Admission: 4/11/2021  Date of Discharge:  4/17/2021    Procedures:  Vaginal, Spontaneous     4/15/2021    1:32 PM      Service:  Obstetrics    Hospital Course:  Patient underwent IOL for preeclampsia and subsequent  vaginal delivery.  She received IV magnesium sulfate.  She remained in the hospital for 2 days.  During that time she remained afebrile and hemodynamically stable. Her blood pressures remained stable on po labetalol.   On the day of discharge, she was eating, ambulating and voiding without difficulty.      Lab Results   Component Value Date    WBC 19.23 (H) 04/16/2021    HGB 9.8 (L) 04/16/2021    HCT 32.7 (L) 04/16/2021    MCV 90.6 04/16/2021     04/16/2021    URICACID 5.5 04/14/2021    AST 20 04/15/2021    ALT 5 04/15/2021     04/14/2021     Results from last 7 days   Lab Units 04/12/21  0101   ABO TYPING  A   RH TYPING  Positive   ANTIBODY SCREEN  Negative       Discharge Medications     Discharge Medications      New Medications      Instructions Start Date   labetalol 100 MG tablet  Commonly known as: NORMODYNE   100 mg, Oral, Every 8 Hours Scheduled         Continue These Medications      Instructions Start Date   ferrous sulfate 325 (65 FE) MG tablet   325 mg, Oral, Daily With Breakfast      Flinstones Gummies Omega-3 DHA chewable tablet   2 each, Oral             Discharge Disposition:  To Home    Discharge Condition:  Stable    Discharge Diet: regular    Activity at Discharge: Pelvic rest    Follow-up Appointments  1 week with Alina for blood pressure check.       Cat Cortez CNM  04/17/21  12:39 EDT

## 2021-04-17 NOTE — PROGRESS NOTES
[facility]  Vaginal Delivery Progress Note    Subjective     Doing well, pain controlled, lochia less than menses      Objective     Vital Signs Range for the last 24 hours  Temperature: Temp:  [97.9 °F (36.6 °C)-98.1 °F (36.7 °C)] 98 °F (36.7 °C)   Temp Source: Temp src: Oral   BP: BP: (122-143)/(64-91) 141/73   Pulse: Heart Rate:  [81-98] 91   Respirations: Resp:  [14-18] 16   SPO2: SpO2:  [98 %-100 %] 99 %   O2 Amount (l/min):     O2 Devices Device (Oxygen Therapy): room air         Physical Exam:  General:  no acute distresss.  Abdomen: Soft, non-tender, fundus firm  Extremities: normal, atraumatic, no cyanosis, and trace edema.       Lab results reviewed:  Yes    Lab Results   Component Value Date    WBC 19.23 (H) 04/16/2021    HGB 9.8 (L) 04/16/2021    HCT 32.7 (L) 04/16/2021    MCV 90.6 04/16/2021     04/16/2021         Assessment/Plan       Normal spontaneous vaginal delivery    Gestational HTN    Severe preeclampsia, third trimester      Shirley Leila Mills is Day 2  post-partum       Plan:  Discharge home with standard precautions and return to clinic in 4-6 weeks.      Cat Cortez CNM  4/17/2021  12:36 EDT

## 2021-04-17 NOTE — PLAN OF CARE
Goal Outcome Evaluation:  Plan of Care Reviewed With: patient  Progress: improving  Outcome Summary: VSS, PO BP meds, uterus firm, lochia small, bottle feeding

## 2021-04-19 LAB
BACTERIA SPEC AEROBE CULT: NORMAL
CYTO UR: NORMAL
LAB AP CASE REPORT: NORMAL
LAB AP CLINICAL INFORMATION: NORMAL
PATH REPORT.FINAL DX SPEC: NORMAL
PATH REPORT.GROSS SPEC: NORMAL

## 2021-04-19 NOTE — PAYOR COMM NOTE
"Reji Mills (20 y.o. Female)     Auth#[id number]    Discharged 4/17/21.  (Baby stayed 1 extra day due to Hyperbili).    From: Jamilatony Murciacarmelo  #[phone]  Fax#[phone]      Date of Birth Social Security Number Address Home Phone MRN    2000  [address] [facility] [address] [phone] [id number]    Religious Marital Status          None Single       Admission Date Admission Type Admitting Provider Attending Provider Department, Room/Bed    4/11/21 Elective Kyara Lino MD  [facility] MOTHER BABY 4B, N427/1    Discharge Date Discharge Disposition Discharge Destination        4/17/2021 Home or Self Care              Attending Provider: (none)   Allergies: No Known Allergies    Isolation: None   Infection: None   Code Status: Prior    Ht: 162.6 cm (64\")   Wt: 105 kg (230 lb 9.6 oz)    Admission Cmt: None   Principal Problem: Normal spontaneous vaginal delivery [O80]                 Active Insurance as of 4/11/2021     Primary Coverage     Payor Plan Insurance Group Employer/Plan Group    [employer]      Payor Plan Address Payor Plan Phone Number Payor Plan Fax Number Effective Dates    [address] [address] [phone]  10/30/2020 - None Entered    [facility] [id number]       Subscriber Name Subscriber Birth Date Member ID       REJI MILLS 2000 [id number]                 Emergency Contacts      (Rel.) Home Phone Work Phone Mobile Phone    HALEIGH JENKINS (Significant Other) -- -- [phone]    MARTHA BATES (Mother) [phone] -- --               Discharge Summary      Cat Cortez CNM at 04/17/21 1239     Attestation signed by Felisha Dinh DO at 04/17/21 1329    Agree with above                  [facility]  Vaginal Delivery Discharge Summary      Patient: Reji Mills      MR#:[id number]  Admission  Diagnosis: term pregnancy  Discharge Diagnosis: vaginal delivery    Date of Admission: " 4/11/2021  Date of Discharge:  4/17/2021    Procedures:  Vaginal, Spontaneous     4/15/2021    1:32 PM      Service:  Obstetrics    Hospital Course:  Patient underwent IOL for preeclampsia and subsequent  vaginal delivery.  She received IV magnesium sulfate.  She remained in the hospital for 2 days.  During that time she remained afebrile and hemodynamically stable. Her blood pressures remained stable on po labetalol.   On the day of discharge, she was eating, ambulating and voiding without difficulty.      Lab Results   Component Value Date    WBC 19.23 (H) 04/16/2021    HGB 9.8 (L) 04/16/2021    HCT 32.7 (L) 04/16/2021    MCV 90.6 04/16/2021     04/16/2021    URICACID 5.5 04/14/2021    AST 20 04/15/2021    ALT 5 04/15/2021     04/14/2021     Results from last 7 days   Lab Units 04/12/21  0101   ABO TYPING  A   RH TYPING  Positive   ANTIBODY SCREEN  Negative       Discharge Medications     Discharge Medications      New Medications      Instructions Start Date   labetalol 100 MG tablet  Commonly known as: NORMODYNE   100 mg, Oral, Every 8 Hours Scheduled         Continue These Medications      Instructions Start Date   ferrous sulfate 325 (65 FE) MG tablet   325 mg, Oral, Daily With Breakfast      Flinstones Gummies Omega-3 DHA chewable tablet   2 each, Oral             Discharge Disposition:  To Home    Discharge Condition:  Stable    Discharge Diet: regular    Activity at Discharge: Pelvic rest    Follow-up Appointments  1 week with Alina for blood pressure check.       Cat Cortez CNM  04/17/21  12:39 EDT    Electronically signed by Felisha Dinh DO at 04/17/21 6740

## 2021-04-23 ENCOUNTER — HOSPITAL ENCOUNTER (OUTPATIENT)
Facility: HOSPITAL | Age: 21
Discharge: HOME | End: 2021-04-23
Attending: ADVANCED PRACTICE MIDWIFE | Admitting: OBSTETRICS & GYNECOLOGY

## 2021-04-23 VITALS
TEMPERATURE: 97.7 F | HEART RATE: 80 BPM | RESPIRATION RATE: 16 BRPM | DIASTOLIC BLOOD PRESSURE: 89 MMHG | SYSTOLIC BLOOD PRESSURE: 153 MMHG

## 2021-04-23 LAB
ALP SERPL-CCNC: 121 U/L (ref 39–117)
ALT SERPL W P-5'-P-CCNC: 11 U/L (ref 1–33)
AST SERPL-CCNC: 15 U/L (ref 1–32)
BILIRUB SERPL-MCNC: 0.2 MG/DL (ref 0–1.2)
CREAT SERPL-MCNC: 0.89 MG/DL (ref 0.57–1)
DEPRECATED RDW RBC AUTO: 49.2 FL (ref 37–54)
ERYTHROCYTE [DISTWIDTH] IN BLOOD BY AUTOMATED COUNT: 15.8 % (ref 12.3–15.4)
HCT VFR BLD AUTO: 36.2 % (ref 34–46.6)
HGB BLD-MCNC: 11.2 G/DL (ref 12–15.9)
LDH SERPL-CCNC: 207 U/L (ref 135–214)
MCH RBC QN AUTO: 26.4 PG (ref 26.6–33)
MCHC RBC AUTO-ENTMCNC: 30.9 G/DL (ref 31.5–35.7)
MCV RBC AUTO: 85.4 FL (ref 79–97)
PLATELET # BLD AUTO: 568 10*3/MM3 (ref 140–450)
PMV BLD AUTO: 9 FL (ref 6–12)
RBC # BLD AUTO: 4.24 10*6/MM3 (ref 3.77–5.28)
URATE SERPL-MCNC: 7.4 MG/DL (ref 2.4–5.7)
WBC # BLD AUTO: 10.07 10*3/MM3 (ref 3.4–10.8)

## 2021-04-23 PROCEDURE — 84450 TRANSFERASE (AST) (SGOT): CPT | Performed by: OBSTETRICS & GYNECOLOGY

## 2021-04-23 PROCEDURE — 85027 COMPLETE CBC AUTOMATED: CPT | Performed by: OBSTETRICS & GYNECOLOGY

## 2021-04-23 PROCEDURE — 84075 ASSAY ALKALINE PHOSPHATASE: CPT | Performed by: OBSTETRICS & GYNECOLOGY

## 2021-04-23 PROCEDURE — 36415 COLL VENOUS BLD VENIPUNCTURE: CPT | Performed by: OBSTETRICS & GYNECOLOGY

## 2021-04-23 PROCEDURE — 99218 PR INITIAL OBSERVATION CARE/DAY 30 MINUTES: CPT | Performed by: OBSTETRICS & GYNECOLOGY

## 2021-04-23 PROCEDURE — 82565 ASSAY OF CREATININE: CPT | Performed by: OBSTETRICS & GYNECOLOGY

## 2021-04-23 PROCEDURE — 83615 LACTATE (LD) (LDH) ENZYME: CPT | Performed by: OBSTETRICS & GYNECOLOGY

## 2021-04-23 PROCEDURE — 84460 ALANINE AMINO (ALT) (SGPT): CPT | Performed by: OBSTETRICS & GYNECOLOGY

## 2021-04-23 PROCEDURE — G0463 HOSPITAL OUTPT CLINIC VISIT: HCPCS

## 2021-04-23 PROCEDURE — 82247 BILIRUBIN TOTAL: CPT | Performed by: OBSTETRICS & GYNECOLOGY

## 2021-04-23 PROCEDURE — 84550 ASSAY OF BLOOD/URIC ACID: CPT | Performed by: OBSTETRICS & GYNECOLOGY

## 2021-04-23 RX ORDER — BUTALBITAL, ACETAMINOPHEN AND CAFFEINE 50; 325; 40 MG/1; MG/1; MG/1
2 TABLET ORAL EVERY 4 HOURS PRN
Status: DISCONTINUED | OUTPATIENT
Start: 2021-04-23 | End: 2021-04-23 | Stop reason: HOSPADM

## 2021-04-23 RX ORDER — ACETAMINOPHEN 325 MG/1
325 TABLET ORAL EVERY 6 HOURS PRN
COMMUNITY

## 2021-04-23 RX ADMIN — BUTALBITAL, ACETAMINOPHEN AND CAFFEINE 2 TABLET: 50; 325; 40 TABLET ORAL at 20:25

## 2021-04-24 NOTE — H&P
[facility]  Obstetric History and Physical    Chief Complaint   Patient presents with   • Headache       HPI    Patient is a 20 y.o. female  postpartum from an IOL at 36 weeks due to pre-eclampsia.  She was discharged on .  She has done fairly well and has lost 50 lbs since discharge due to significant edema she had.  She presents tonight due to a low grade headache and she then subsequently took her BP and it was elevated.  Not sure of the numbers.   She called in and was recommended to come in.  Here her BPs were in the 140-150s range.  She had yet to have taken her scheduled ose of Labetalol.  She took it here in triage after prompting.   She was also given 2 Fioricet which had some improvement of her headache.   She was getting engorged and feeling that she needed to pump so I felt that she was stable enough to be discharged.          The following portions of the patients history were reviewed and updated as appropriate: current medications, allergies, past medical history, past surgical history, past family history, past social history and problem list .       Prenatal Information:   Maternal Prenatal Labs  Blood Type No results found for: ABO   Rh Status No results found for: RH   Antibody Screen No results found for: ABSCRN   Gonnorhea No results found for: GCCX   Chlamydia No results found for: CLAMYDCU   RPR No results found for: RPR   Syphilis Antibody No results found for: SYPHILIS   Rubella No results found for: RUBELLAIGGIN   Hepatitis B Surface Antigen No results found for: HEPBSAG   HIV-1 Antibody No results found for: LABHIV1   Hepatitis C Antibody No results found for: HEPCAB   Rapid Urin Drug Screen No results found for: AMPMETHU, BARBITSCNUR, LABBENZSCN, LABMETHSCN, LABOPIASCN, THCURSCR, COCAINEUR, AMPHETSCREEN, PROPOXSCN, BUPRENORSCNU, METAMPSCNUR, OXYCODONESCN, TRICYCLICSCN   Group B Strep Culture No results found for: GBSANTIGEN           External Prenatal Results     Pregnancy  Outside Results - Transcribed From Office Records - See Scanned Records For Details     Test Value Date Time    Hgb  11.2 g/dL 04/23/21 2008       9.8 g/dL 04/16/21 0626       10.7 g/dL 04/15/21 2030       10.2 g/dL 04/14/21 0523       11.1 g/dL 04/12/21 0101       10.9 g/dL 04/11/21 0051       11.3 g/dL 04/01/21 1503       9.5 g/dL 03/09/21 2327       10.5 g/dL 02/10/21 1108       11.7 g/dL 10/30/20 1105    Hct  36.2 % 04/23/21 2008       32.7 % 04/16/21 0626       33.4 % 04/15/21 2030       32.4 % 04/14/21 0523       34.8 % 04/12/21 0101       34.2 % 04/11/21 0051       35.6 % 04/01/21 1503       29.9 % 03/09/21 2327       33.6 % 02/10/21 1108       35.1 % 10/30/20 1105    ABO  A  04/12/21 0101    Rh  Positive  04/12/21 0101    Antibody Screen  Negative  04/12/21 0101       Negative  02/10/21 1108       Negative  11/05/20 1656    Glucose Fasting GTT       Glucose Tolerance Test 1 hour       Glucose Tolerance Test 3 hour       Gonorrhea (discrete)       Chlamydia (discrete)       RPR  Non-Reactive  10/30/20 1105    VDRL       Syphilis Antibody       Rubella  Positive  10/30/20 1105    HBsAg  Non-Reactive  10/30/20 1105    Herpes Simplex Virus PCR       Herpes Simplex VIrus Culture       HIV  Non-Reactive  10/30/20 1105    Hep C RNA Quant PCR       Hep C Antibody  Non-Reactive  10/30/20 1105    AFP       Group B Strep  No Group B Streptococcus isolated  04/13/21 1235    GBS Susceptibility to Clindamycin       GBS Susceptibility to Erythromycin       Fetal Fibronectin       Genetic Testing, Maternal Blood             Drug Screening     Test Value Date Time    Urine Drug Screen       Amphetamine Screen  Negative  10/30/20 1105    Barbiturate Screen  Negative  10/30/20 1105    Benzodiazepine Screen  Negative  10/30/20 1105    Methadone Screen  Negative  10/30/20 1105    Phencyclidine Screen  Negative  10/30/20 1105    Opiates Screen  Negative  10/30/20 1105    THC Screen  Negative  10/30/20 1105    Cocaine Screen        Propoxyphene Screen  Negative  10/30/20 1105    Buprenorphine Screen  Negative  10/30/20 110    Methamphetamine Screen       Oxycodone Screen  Negative  10/30/20 1105    Tricyclic Antidepressants Screen  Negative  10/30/20 110          Legend    ^: Historical                          Past OB History:     OB History    Para Term  AB Living   1 1 0 1 0 1   SAB TAB Ectopic Molar Multiple Live Births   0 0 0 0 0 1      # Outcome Date GA Lbr Abner/2nd Weight Sex Delivery Anes PTL Lv   1  04/15/21 36w2d  2990 g (6 lb 9.5 oz) F Vag-Spont EPI Y GIULIA      Name: YULIA FOX      Apgar1: 3  Apgar5: 6       Past Medical History: Past Medical History:   Diagnosis Date   • Asthma    • Seasonal allergies    • Urinary tract infection       Past Surgical History No past surgical history on file.   Family History: Family History   Problem Relation Age of Onset   • No Known Problems Mother    • No Known Problems Father       Social History:  reports that she has never smoked. She has never used smokeless tobacco.   reports no history of alcohol use.   reports no history of drug use.        Review of Systems  Denies fever,CP, Shortness of air, muscle weakness,                                             and rashes      Objective     Vital Signs Range for the last 24 hours  Temperature: Temp:  [97.7 °F (36.5 °C)] 97.7 °F (36.5 °C)   Temp Source: Temp src: Oral   BP: BP: (153-155)/(89-98) 153/89   Pulse: Heart Rate:  [80-89] 80   Respirations: Resp:  [16] 16   SPO2:     O2 Amount (l/min):     O2 Devices     Weight:       Physical Examination: General appearance - alert, well appearing, and in no distress and oriented to person, place, and time  Chest - clear to auscultation, no wheezes, rales or rhonchi, symmetric air entry  Heart - S1 and S2 normal, no murmurs noted  Abdomen - soft, nontender, nondistended, no masses or organomegaly  no rebound tenderness noted  bowel sounds normal  No guarding, No RUQ  pain  Extremities - pedal edema 2 +        Laboratory Results:   Lab Results   Component Value Date     (H) 04/23/2021    HGB 11.2 (L) 04/23/2021    HCT 36.2 04/23/2021    WBC 10.07 04/23/2021     Lab Results   Component Value Date    HGB 11.2 (L) 04/23/2021     (H) 04/23/2021    AST 15 04/23/2021    ALT 11 04/23/2021     04/23/2021    URICACID 7.4 (H) 04/23/2021    BUN 5 (L) 04/15/2021    CREATININE 0.89 04/23/2021    GLUCOSE 203 (H) 04/15/2021           Assessment/Plan  1. Post partum from IOL at 36 weeks on 4/15  2. Headache improving with treatment  3. Normal PEP.   Blood pressures elevated, but stable.   4. Since she had no significant S/S that warranted admission and re-Magnesium and she was getting uncomfortable from not pumping, I offered to let her discharge even though the headache had not completely resolved, but it was improving.   I did review S/S for when she should return for re-evaluation and recommended that she call the office for a sooner check up than waiting 6 weeks given her clinical course around delivery.         Fareed Shetty MD  4/23/2021  21:40 EDT

## 2021-04-24 NOTE — NURSING NOTE
D/C instructions reviewed with pt. Pt VU and has no questions. Ambulated off unit in stable condition accompanied by FOB. Pt denies need for wheelchair ride. Has all belongings.

## 2021-12-29 ENCOUNTER — HOSPITAL ENCOUNTER (EMERGENCY)
Facility: HOSPITAL | Age: 21
Discharge: HOME | End: 2021-12-29
Attending: EMERGENCY MEDICINE | Admitting: EMERGENCY MEDICINE

## 2021-12-29 VITALS
BODY MASS INDEX: 30.73 KG/M2 | OXYGEN SATURATION: 98 % | SYSTOLIC BLOOD PRESSURE: 127 MMHG | TEMPERATURE: 98.6 F | HEIGHT: 64 IN | RESPIRATION RATE: 17 BRPM | HEART RATE: 87 BPM | WEIGHT: 180 LBS | DIASTOLIC BLOOD PRESSURE: 68 MMHG

## 2021-12-29 DIAGNOSIS — A60.04 HERPES SIMPLEX VULVOVAGINITIS: Primary | ICD-10-CM

## 2021-12-29 PROCEDURE — 99283 EMERGENCY DEPT VISIT LOW MDM: CPT

## 2021-12-29 PROCEDURE — 87255 GENET VIRUS ISOLATE HSV: CPT | Performed by: EMERGENCY MEDICINE

## 2022-01-02 ENCOUNTER — TELEPHONE (OUTPATIENT)
Dept: EMERGENCY DEPT | Facility: HOSPITAL | Age: 22
End: 2022-01-02

## 2022-01-02 LAB — HSV SPEC CULT: POSITIVE

## 2024-07-15 ENCOUNTER — LAB (OUTPATIENT)
Dept: LAB | Facility: HOSPITAL | Age: 24
End: 2024-07-15
Payer: MEDICAID

## 2024-07-15 ENCOUNTER — TRANSCRIBE ORDERS (OUTPATIENT)
Dept: LAB | Facility: HOSPITAL | Age: 24
End: 2024-07-15
Payer: MEDICAID

## 2024-07-15 DIAGNOSIS — Z32.01 PREGNANCY EXAMINATION OR TEST, POSITIVE RESULT: Primary | ICD-10-CM

## 2024-07-15 LAB — HCG INTACT+B SERPL-ACNC: NORMAL MIU/ML

## 2024-07-15 PROCEDURE — 84702 CHORIONIC GONADOTROPIN TEST: CPT | Performed by: OBSTETRICS & GYNECOLOGY

## 2024-07-15 PROCEDURE — 36415 COLL VENOUS BLD VENIPUNCTURE: CPT | Performed by: OBSTETRICS & GYNECOLOGY

## 2024-08-12 ENCOUNTER — LAB (OUTPATIENT)
Dept: LAB | Facility: HOSPITAL | Age: 24
End: 2024-08-12
Payer: MEDICAID

## 2024-08-12 ENCOUNTER — TRANSCRIBE ORDERS (OUTPATIENT)
Dept: LAB | Facility: HOSPITAL | Age: 24
End: 2024-08-12
Payer: MEDICAID

## 2024-08-12 DIAGNOSIS — Z34.81 PRENATAL CARE, SUBSEQUENT PREGNANCY, FIRST TRIMESTER: ICD-10-CM

## 2024-08-12 DIAGNOSIS — Z32.01 PREGNANCY EXAMINATION OR TEST, POSITIVE RESULT: Primary | ICD-10-CM

## 2024-08-12 DIAGNOSIS — Z32.01 PREGNANCY EXAMINATION OR TEST, POSITIVE RESULT: ICD-10-CM

## 2024-08-12 LAB
ABO GROUP BLD: NORMAL
BLD GP AB SCN SERPL QL: NEGATIVE
DEPRECATED RDW RBC AUTO: 40.8 FL (ref 37–54)
ERYTHROCYTE [DISTWIDTH] IN BLOOD BY AUTOMATED COUNT: 13.6 % (ref 12.3–15.4)
HCT VFR BLD AUTO: 34.6 % (ref 34–46.6)
HGB BLD-MCNC: 11.3 G/DL (ref 12–15.9)
MCH RBC QN AUTO: 27 PG (ref 26.6–33)
MCHC RBC AUTO-ENTMCNC: 32.7 G/DL (ref 31.5–35.7)
MCV RBC AUTO: 82.6 FL (ref 79–97)
PLATELET # BLD AUTO: 274 10*3/MM3 (ref 140–450)
PMV BLD AUTO: 8.9 FL (ref 6–12)
RBC # BLD AUTO: 4.19 10*6/MM3 (ref 3.77–5.28)
RH BLD: POSITIVE
WBC NRBC COR # BLD AUTO: 7.17 10*3/MM3 (ref 3.4–10.8)

## 2024-08-12 PROCEDURE — 85027 COMPLETE CBC AUTOMATED: CPT

## 2024-08-12 PROCEDURE — 86762 RUBELLA ANTIBODY: CPT

## 2024-08-12 PROCEDURE — 86850 RBC ANTIBODY SCREEN: CPT

## 2024-08-12 PROCEDURE — 36415 COLL VENOUS BLD VENIPUNCTURE: CPT

## 2024-08-12 PROCEDURE — 80053 COMPREHEN METABOLIC PANEL: CPT

## 2024-08-12 PROCEDURE — 87340 HEPATITIS B SURFACE AG IA: CPT

## 2024-08-12 PROCEDURE — 86780 TREPONEMA PALLIDUM: CPT

## 2024-08-12 PROCEDURE — 83615 LACTATE (LD) (LDH) ENZYME: CPT

## 2024-08-12 PROCEDURE — G0432 EIA HIV-1/HIV-2 SCREEN: HCPCS

## 2024-08-12 PROCEDURE — 86803 HEPATITIS C AB TEST: CPT

## 2024-08-12 PROCEDURE — 86901 BLOOD TYPING SEROLOGIC RH(D): CPT

## 2024-08-12 PROCEDURE — 86900 BLOOD TYPING SEROLOGIC ABO: CPT

## 2024-08-12 PROCEDURE — 84550 ASSAY OF BLOOD/URIC ACID: CPT

## 2024-08-13 LAB
ALBUMIN SERPL-MCNC: 4.1 G/DL (ref 3.5–5.2)
ALBUMIN/GLOB SERPL: 1.2 G/DL
ALP SERPL-CCNC: 62 U/L (ref 39–117)
ALT SERPL W P-5'-P-CCNC: 17 U/L (ref 1–33)
ANION GAP SERPL CALCULATED.3IONS-SCNC: 13.6 MMOL/L (ref 5–15)
AST SERPL-CCNC: 19 U/L (ref 1–32)
BILIRUB SERPL-MCNC: <0.2 MG/DL (ref 0–1.2)
BUN SERPL-MCNC: 10 MG/DL (ref 6–20)
BUN/CREAT SERPL: 19.6 (ref 7–25)
CALCIUM SPEC-SCNC: 9.2 MG/DL (ref 8.6–10.5)
CHLORIDE SERPL-SCNC: 103 MMOL/L (ref 98–107)
CO2 SERPL-SCNC: 20.4 MMOL/L (ref 22–29)
CREAT SERPL-MCNC: 0.51 MG/DL (ref 0.57–1)
EGFRCR SERPLBLD CKD-EPI 2021: 134.7 ML/MIN/1.73
GLOBULIN UR ELPH-MCNC: 3.3 GM/DL
GLUCOSE SERPL-MCNC: 75 MG/DL (ref 65–99)
HBV SURFACE AG SERPL QL IA: NORMAL
HIV 1+2 AB+HIV1 P24 AG SERPL QL IA: NORMAL
LDH SERPL-CCNC: 247 U/L (ref 135–214)
POTASSIUM SERPL-SCNC: 4.1 MMOL/L (ref 3.5–5.2)
PROT SERPL-MCNC: 7.4 G/DL (ref 6–8.5)
SODIUM SERPL-SCNC: 137 MMOL/L (ref 136–145)
TREPONEMA PALLIDUM IGG+IGM AB [PRESENCE] IN SERUM OR PLASMA BY IMMUNOASSAY: NORMAL
URATE SERPL-MCNC: 3.1 MG/DL (ref 2.4–5.7)

## 2024-08-14 LAB
HCV AB SERPL QL IA: NORMAL
HCV IGG SERPL QL IA: NON REACTIVE
RUBV IGG SERPL IA-ACNC: 2.79 INDEX

## 2024-09-03 ENCOUNTER — HOSPITAL ENCOUNTER (EMERGENCY)
Facility: HOSPITAL | Age: 24
Discharge: HOME | End: 2024-09-03
Attending: EMERGENCY MEDICINE
Payer: MEDICAID

## 2024-09-03 VITALS
TEMPERATURE: 98.8 F | HEART RATE: 92 BPM | HEIGHT: 64 IN | DIASTOLIC BLOOD PRESSURE: 65 MMHG | BODY MASS INDEX: 27.66 KG/M2 | RESPIRATION RATE: 18 BRPM | SYSTOLIC BLOOD PRESSURE: 124 MMHG | WEIGHT: 162 LBS | OXYGEN SATURATION: 100 %

## 2024-09-03 DIAGNOSIS — R35.0 URINARY FREQUENCY: ICD-10-CM

## 2024-09-03 DIAGNOSIS — Z34.92 SECOND TRIMESTER PREGNANCY: ICD-10-CM

## 2024-09-03 DIAGNOSIS — R30.0 DYSURIA: ICD-10-CM

## 2024-09-03 DIAGNOSIS — N39.0 ACUTE UTI: Primary | ICD-10-CM

## 2024-09-03 DIAGNOSIS — R39.15 URINARY URGENCY: ICD-10-CM

## 2024-09-03 LAB
BACTERIA UR QL AUTO: ABNORMAL /HPF
BILIRUB UR QL STRIP: ABNORMAL
CLARITY UR: ABNORMAL
COLOR UR: ABNORMAL
GLUCOSE UR STRIP-MCNC: NEGATIVE MG/DL
HGB UR QL STRIP.AUTO: ABNORMAL
HYALINE CASTS UR QL AUTO: ABNORMAL /LPF
KETONES UR QL STRIP: ABNORMAL
LEUKOCYTE ESTERASE UR QL STRIP.AUTO: ABNORMAL
NITRITE UR QL STRIP: POSITIVE
PH UR STRIP.AUTO: 5.5 [PH] (ref 5–8)
PROT UR QL STRIP: ABNORMAL
RBC # UR STRIP: ABNORMAL /HPF
REF LAB TEST METHOD: ABNORMAL
SP GR UR STRIP: >=1.03 (ref 1–1.03)
SQUAMOUS #/AREA URNS HPF: ABNORMAL /HPF
UROBILINOGEN UR QL STRIP: ABNORMAL
WBC # UR STRIP: ABNORMAL /HPF
WBC CASTS #/AREA URNS LPF: ABNORMAL /LPF

## 2024-09-03 PROCEDURE — 99283 EMERGENCY DEPT VISIT LOW MDM: CPT

## 2024-09-03 PROCEDURE — 87086 URINE CULTURE/COLONY COUNT: CPT | Performed by: PHYSICIAN ASSISTANT

## 2024-09-03 PROCEDURE — 87186 SC STD MICRODIL/AGAR DIL: CPT | Performed by: PHYSICIAN ASSISTANT

## 2024-09-03 PROCEDURE — 87077 CULTURE AEROBIC IDENTIFY: CPT | Performed by: PHYSICIAN ASSISTANT

## 2024-09-03 PROCEDURE — 81001 URINALYSIS AUTO W/SCOPE: CPT | Performed by: EMERGENCY MEDICINE

## 2024-09-03 RX ORDER — CEFUROXIME AXETIL 500 MG/1
500 TABLET ORAL 2 TIMES DAILY
Qty: 14 TABLET | Refills: 0 | Status: SHIPPED | OUTPATIENT
Start: 2024-09-03

## 2024-09-03 RX ORDER — CEFUROXIME AXETIL 250 MG/1
500 TABLET ORAL ONCE
Status: COMPLETED | OUTPATIENT
Start: 2024-09-03 | End: 2024-09-03

## 2024-09-03 RX ADMIN — CEFUROXIME AXETIL 500 MG: 250 TABLET, FILM COATED ORAL at 20:28

## 2024-09-03 NOTE — ED PROVIDER NOTES
Subjective   History of Present Illness  This is a 24-year-old female that presents the ER with dysuria, urgency, and frequency that started yesterday.  Patient is in her second trimester of pregnancy and is approximately 14 weeks gestation.  She follows with Dr. Lexie Phan.  Patient says she does have history of UTIs in the past.  Last UTI was at the end of July, 2024.  She was evaluated at [facility] and prescribed Duricef twice daily.  There was no urine culture performed at that time.  Patient does not have any previous abnormal urine cultures in epic.  She denies any lower abdominal pain or cramping.  She denies any flank or CVA pain.  She denies nausea or vomiting or fever/chills.  Patient denies any vaginal bleeding or discharge.  Past medical history is significant for seasonal allergies and asthma.  No other concerns at this time.  Patient says she usually drinks 1 to 2 glasses of tea per day but no other caffeinated beverages.    History provided by:  Patient  Dysuria  Pain quality:  Burning  Onset quality:  Sudden  Duration:  24 hours  Timing:  Constant  Progression:  Unchanged  Chronicity:  Recurrent (Last UTI was 7/31/2024)  Relieved by:  Nothing  Worsened by:  Nothing  Ineffective treatments:  None tried  Urinary symptoms: frequent urination    Urinary symptoms: no discolored urine, no foul-smelling urine, no hematuria and no bladder incontinence    Associated symptoms: no abdominal pain, no fever, no flank pain, no nausea, no vaginal discharge and no vomiting    Risk factors: pregnant (Patient approximately 14 weeks gestation)        Review of Systems   Constitutional: Negative.  Negative for appetite change, chills, fatigue and fever.   Respiratory: Negative.     Cardiovascular: Negative.    Gastrointestinal: Negative.  Negative for abdominal distention, abdominal pain, constipation, diarrhea, nausea and vomiting.   Genitourinary:  Positive for dysuria, frequency and urgency. Negative  for decreased urine volume, flank pain, hematuria, vaginal bleeding and vaginal discharge.        Patient 14 weeks gestation.  She follows with Dr. Lexie Phan's OB/GYN.  Onset of dysuria, urgency, and frequency yesterday with last UTI on 7/31/2024.   Musculoskeletal: Negative.  Negative for back pain.   All other systems reviewed and are negative.      Past Medical History:   Diagnosis Date    Asthma     Seasonal allergies     Urinary tract infection        No Known Allergies    History reviewed. No pertinent surgical history.    Family History   Problem Relation Age of Onset    No Known Problems Mother     No Known Problems Father        Social History     Socioeconomic History    Marital status: Single    Number of children: 0    Highest education level: High school graduate   Tobacco Use    Smoking status: Never    Smokeless tobacco: Never   Vaping Use    Vaping status: Never Used   Substance and Sexual Activity    Alcohol use: Never    Drug use: Never           Objective   Physical Exam  Vitals and nursing note reviewed.   Constitutional:       General: She is not in acute distress.     Appearance: Normal appearance. She is not ill-appearing, toxic-appearing or diaphoretic.      Comments: No acute sign of pain or distress.  Nontoxic   HENT:      Head: Normocephalic and atraumatic.      Nose: Nose normal.      Mouth/Throat:      Mouth: Mucous membranes are moist.      Comments: Oral mucous membranes are moist  Eyes:      Extraocular Movements: Extraocular movements intact.      Conjunctiva/sclera: Conjunctivae normal.      Pupils: Pupils are equal, round, and reactive to light.   Cardiovascular:      Rate and Rhythm: Normal rate and regular rhythm. No extrasystoles are present.     Pulses: Normal pulses.      Heart sounds: Normal heart sounds.      Comments: Regular rate and rhythm.  No ectopy  Pulmonary:      Effort: Pulmonary effort is normal.      Breath sounds: Normal breath sounds.      Comments: Lungs are  clear to auscultation bilaterally  Abdominal:      General: Bowel sounds are normal. There is no distension.      Palpations: Abdomen is soft.      Tenderness: There is no abdominal tenderness. There is no right CVA tenderness, left CVA tenderness, guarding or rebound.      Comments: Abdomen soft and nontender.  Active bowel sounds in all 4 quadrants.  No flank or CVA tenderness.   Musculoskeletal:         General: Normal range of motion.      Cervical back: Normal range of motion and neck supple.      Right lower leg: No edema.      Left lower leg: No edema.   Skin:     General: Skin is warm and dry.   Neurological:      General: No focal deficit present.      Mental Status: She is alert and oriented to person, place, and time.      Cranial Nerves: Cranial nerves 2-12 are intact.      Sensory: Sensation is intact.      Motor: Motor function is intact.      Coordination: Coordination is intact.      Gait: Gait is intact.      Comments: Neuro intact and nonfocal         Procedures           ED Course  ED Course as of 09/03/24 2114 Tue Sep 03, 2024   1935 Last UTI was on 7/31/24 diagnosed at . Rx for Duricef BID. [FC]   2111 Patient is afebrile and all other vital signs are stable.  Patient reports onset of dysuria, urgency, and frequency that started yesterday.  She is 14 weeks gestation.  Urinalysis reveals 1+ leukocytes, positive nitrite, too numerous to count white blood cells, 4+ bacteria.  I reviewed previous urine cultures in epic and there are no abnormal urine cultures.  Discussed antibiotic choice with hospital pharmacist.  Patient has no known drug allergies.  We will initiate Ceftin 500 mg by mouth twice daily x 7 days.  Urine cultures in process.  Recommend for stable recheck with Dr. Lexie Phan, routine OB/GYN, for recheck on urine culture results and further assessment.  Return to the ER if worsening symptoms. [FC]      ED Course User Index  [FC] Christie Hendrickson PA-C                                "    Recent Results (from the past 24 hour(s))   Urinalysis without microscopic (no culture) - Urine, Clean Catch    Collection Time: 09/03/24  7:14 PM    Specimen: Urine, Clean Catch   Result Value Ref Range    Color, UA Orange (A) Yellow, Straw    Appearance, UA Turbid (A) Clear    pH, UA 5.5 5.0 - 8.0    Specific Gravity, UA >=1.030 1.001 - 1.030    Glucose, UA Negative Negative    Ketones, UA 15 mg/dL (1+) (A) Negative    Bilirubin, UA Small (1+) (A) Negative    Blood, UA Large (3+) (A) Negative    Protein, UA >=300 mg/dL (3+) (A) Negative    Leuk Esterase, UA Small (1+) (A) Negative    Nitrite, UA Positive (A) Negative    Urobilinogen, UA 1.0 E.U./dL 0.2 - 1.0 E.U./dL   Urinalysis, Microscopic Only - Urine, Clean Catch    Collection Time: 09/03/24  7:14 PM    Specimen: Urine, Clean Catch   Result Value Ref Range    RBC, UA 11-20 (A) None Seen, 0-2 /HPF    WBC, UA Too Numerous to Count (A) None Seen, 0-2 /HPF    Bacteria, UA 4+ (A) None Seen, Trace /HPF    Squamous Epithelial Cells, UA 7-12 (A) None Seen, 0-2 /HPF    Hyaline Casts, UA 0-6 0 - 6 /LPF    WBC Casts 0-2 None Seen /LPF    Methodology Manual Light Microscopy      Note: In addition to lab results from this visit, the labs listed above may include labs taken at another facility or during a different encounter within the last 24 hours. Please correlate lab times with ED admission and discharge times for further clarification of the services performed during this visit.    No orders to display     Vitals:    09/03/24 1852 09/03/24 1951   BP: 124/65    BP Location: Right arm    Patient Position: Sitting    Pulse: 99 92   Resp: 18 18   Temp: 98.8 °F (37.1 °C)    TempSrc: Oral    SpO2: 100% 100%   Weight: 73.5 kg (162 lb)    Height: 162 cm (63.78\")      Medications   cefuroxime (CEFTIN) tablet 500 mg (500 mg Oral Given 9/3/24 2028)     ECG/EMG Results (last 24 hours)       ** No results found for the last 24 hours. **          No orders to display             "     Medical Decision Making  Risk  Prescription drug management.        Final diagnoses:   Acute UTI   Dysuria   Urinary urgency   Urinary frequency   Second trimester pregnancy       ED Disposition  ED Disposition       ED Disposition   Discharge    Condition   Stable    Comment   --               Lexie Phan MD  [address]  [address]  [place] [id number] [phone]    Call in 1 day  Call tomorrow for first available [facility] EMERGENCY DEPARTMENT  [address] [address] [phone]    If symptoms worsen         Medication List        New Prescriptions      cefuroxime 500 MG tablet  Commonly known as: CEFTIN  Take 1 tablet by mouth 2 (Two) Times a Day.            Stop      ibuprofen 600 MG tablet  Commonly known as: ADVIL,MOTRIN     labetalol 100 MG tablet  Commonly known as: NORMODYNE               Where to Get Your Medications        These medications were sent to [id number] - [place], [place] - [address] [facility] - [phone]  - [phone]   [address] [address]      Phone: [phone]   cefuroxime 500 MG tablet            Christie Hendrickson PA-C  09/03/24 6535

## 2024-09-04 NOTE — DISCHARGE INSTRUCTIONS
ER evaluation reveals acute urinary tract infection with too numerous to count white blood cells, positive nitrite, 1+ leukocytes, and 4+ bacteria.  Urine culture is in process.  Recommend increase water intake and avoid all caffeine.  Rx for Ceftin 500 mg by mouth twice daily x 7 days.  Recommend for stable recheck with Lexie Phan, routine OB/GYN, for recheck and follow-up on urine culture results.  Return to the ER if any worsening symptoms of flank or CVA pain or nausea/vomiting or fever.

## 2024-09-05 LAB — BACTERIA SPEC AEROBE CULT: ABNORMAL

## 2024-10-14 ENCOUNTER — TRANSCRIBE ORDERS (OUTPATIENT)
Dept: LAB | Facility: HOSPITAL | Age: 24
End: 2024-10-14
Payer: MEDICAID

## 2024-10-14 ENCOUNTER — LAB (OUTPATIENT)
Dept: LAB | Facility: HOSPITAL | Age: 24
End: 2024-10-14
Payer: MEDICAID

## 2024-10-14 DIAGNOSIS — Z34.92 SECOND TRIMESTER PREGNANCY: Primary | ICD-10-CM

## 2024-10-14 DIAGNOSIS — Z34.92 SECOND TRIMESTER PREGNANCY: ICD-10-CM

## 2024-10-14 LAB
COLLECT DURATION TIME UR: 24 HRS
PROT 24H UR-MRATE: 103.4 MG/24HOURS (ref 0–150)
SPECIMEN VOL 24H UR: 1100 ML

## 2024-10-14 PROCEDURE — 84156 ASSAY OF PROTEIN URINE: CPT

## 2024-10-14 PROCEDURE — 81050 URINALYSIS VOLUME MEASURE: CPT

## 2024-12-09 ENCOUNTER — LAB (OUTPATIENT)
Dept: LAB | Facility: HOSPITAL | Age: 24
End: 2024-12-09
Payer: MEDICAID

## 2024-12-09 ENCOUNTER — TRANSCRIBE ORDERS (OUTPATIENT)
Dept: LAB | Facility: HOSPITAL | Age: 24
End: 2024-12-09
Payer: MEDICAID

## 2024-12-09 DIAGNOSIS — Z3A.24 24 WEEKS GESTATION OF PREGNANCY: ICD-10-CM

## 2024-12-09 DIAGNOSIS — Z34.82 PRENATAL CARE, SUBSEQUENT PREGNANCY, SECOND TRIMESTER: ICD-10-CM

## 2024-12-09 DIAGNOSIS — Z34.81 PRENATAL CARE, SUBSEQUENT PREGNANCY, FIRST TRIMESTER: ICD-10-CM

## 2024-12-09 DIAGNOSIS — Z3A.24 24 WEEKS GESTATION OF PREGNANCY: Primary | ICD-10-CM

## 2024-12-09 DIAGNOSIS — Z32.01 PREGNANCY EXAMINATION OR TEST, POSITIVE RESULT: ICD-10-CM

## 2024-12-09 LAB
AMPHET+METHAMPHET UR QL: NEGATIVE
AMPHETAMINES UR QL: NEGATIVE
BARBITURATES UR QL SCN: NEGATIVE
BENZODIAZ UR QL SCN: NEGATIVE
BLD GP AB SCN SERPL QL: NEGATIVE
BUPRENORPHINE SERPL-MCNC: NEGATIVE NG/ML
CANNABINOIDS SERPL QL: NEGATIVE
COCAINE UR QL: NEGATIVE
CREAT UR-MCNC: 149.5 MG/DL
DEPRECATED RDW RBC AUTO: 34.1 FL (ref 37–54)
ERYTHROCYTE [DISTWIDTH] IN BLOOD BY AUTOMATED COUNT: 12.1 % (ref 12.3–15.4)
FENTANYL UR-MCNC: NEGATIVE NG/ML
GLUCOSE 1H P 100 G GLC PO SERPL-MCNC: 147 MG/DL (ref 65–139)
HCT VFR BLD AUTO: 31.9 % (ref 34–46.6)
HGB BLD-MCNC: 10.5 G/DL (ref 12–15.9)
MCH RBC QN AUTO: 26.1 PG (ref 26.6–33)
MCHC RBC AUTO-ENTMCNC: 32.9 G/DL (ref 31.5–35.7)
MCV RBC AUTO: 79.2 FL (ref 79–97)
METHADONE UR QL SCN: NEGATIVE
OPIATES UR QL: NEGATIVE
OXYCODONE UR QL SCN: NEGATIVE
PCP UR QL SCN: NEGATIVE
PLATELET # BLD AUTO: 353 10*3/MM3 (ref 140–450)
PMV BLD AUTO: 9.4 FL (ref 6–12)
PROT ?TM UR-MCNC: 15.5 MG/DL
PROT/CREAT UR: 103.7 MG/G CREA (ref 0–200)
RBC # BLD AUTO: 4.03 10*6/MM3 (ref 3.77–5.28)
TRICYCLICS UR QL SCN: NEGATIVE
WBC NRBC COR # BLD AUTO: 14.15 10*3/MM3 (ref 3.4–10.8)

## 2024-12-09 PROCEDURE — 85027 COMPLETE CBC AUTOMATED: CPT

## 2024-12-09 PROCEDURE — 86850 RBC ANTIBODY SCREEN: CPT

## 2024-12-09 PROCEDURE — 87086 URINE CULTURE/COLONY COUNT: CPT

## 2024-12-09 PROCEDURE — 87147 CULTURE TYPE IMMUNOLOGIC: CPT

## 2024-12-09 PROCEDURE — 84156 ASSAY OF PROTEIN URINE: CPT

## 2024-12-09 PROCEDURE — 86780 TREPONEMA PALLIDUM: CPT

## 2024-12-09 PROCEDURE — 80307 DRUG TEST PRSMV CHEM ANLYZR: CPT

## 2024-12-09 PROCEDURE — 36415 COLL VENOUS BLD VENIPUNCTURE: CPT

## 2024-12-09 PROCEDURE — 82948 REAGENT STRIP/BLOOD GLUCOSE: CPT | Performed by: OBSTETRICS & GYNECOLOGY

## 2024-12-09 PROCEDURE — 82950 GLUCOSE TEST: CPT

## 2024-12-09 PROCEDURE — 82570 ASSAY OF URINE CREATININE: CPT

## 2024-12-10 LAB
BACTERIA SPEC AEROBE CULT: ABNORMAL
TREPONEMA PALLIDUM IGG+IGM AB [PRESENCE] IN SERUM OR PLASMA BY IMMUNOASSAY: NORMAL

## 2024-12-16 ENCOUNTER — LAB (OUTPATIENT)
Dept: LAB | Facility: HOSPITAL | Age: 24
End: 2024-12-16
Payer: MEDICAID

## 2024-12-16 ENCOUNTER — TRANSCRIBE ORDERS (OUTPATIENT)
Dept: LAB | Facility: HOSPITAL | Age: 24
End: 2024-12-16
Payer: MEDICAID

## 2024-12-16 DIAGNOSIS — Z3A.28 28 WEEKS GESTATION OF PREGNANCY: ICD-10-CM

## 2024-12-16 DIAGNOSIS — Z34.83 PRENATAL CARE, SUBSEQUENT PREGNANCY, THIRD TRIMESTER: ICD-10-CM

## 2024-12-16 DIAGNOSIS — Z34.83 PRENATAL CARE, SUBSEQUENT PREGNANCY, THIRD TRIMESTER: Primary | ICD-10-CM

## 2024-12-16 LAB
GLUCOSE P FAST SERPL-MCNC: 87 MG/DL (ref 65–94)
GTT GEST 2H PNL UR+SERPL: 176 MG/DL (ref 65–179)
GTT GEST 3H PNL SERPL: 171 MG/DL (ref 65–139)
GTT GEST 3H PNL SERPL: 186 MG/DL (ref 65–154)

## 2024-12-16 PROCEDURE — 82948 REAGENT STRIP/BLOOD GLUCOSE: CPT | Performed by: OBSTETRICS & GYNECOLOGY

## 2024-12-16 PROCEDURE — 82952 GTT-ADDED SAMPLES: CPT

## 2024-12-16 PROCEDURE — 36415 COLL VENOUS BLD VENIPUNCTURE: CPT

## 2024-12-16 PROCEDURE — 82951 GLUCOSE TOLERANCE TEST (GTT): CPT

## 2024-12-19 NOTE — PROGRESS NOTES
CC: Gestational diabetes mellitus    Referring Provider: Lexie Phan MD     Patient joined by her mother provides additional history    HPI: Shirley Mills is a 24 y.o. female who comes for consultation regarding gestational diabetes.    Gestational diabetes was diagnosed 12/16/2024 with abnormal OGTT.  OGTT results:   50 g 1 hour OGTT done 12/9/2024  Serum glucose 147 mg/dL    100 g 3-hour OGTT done 12/16/2024  Fasting glucose 87 mg/dL  1 hour glucose 176 mg/dL  2-hour glucose 186 mg/dL  3-hour glucose 171 mg/dL    Prior GDM: none, prior pregnancy without GDM (2021)   Weeks of gestation: 30w0d  Gender: boy     Not checking FSBG at this time     Diet: 2 meals per day   Breakfast: usually skips this meal   Lunch: chick-shani-a (nuggets, fries, bbq sauce, cherry coke)   Dinner: protein with some starch (pasta) and non-starchy vegetable   Drinks: cold brew coffee with sweetener, cherry coke, water   Snacks: 2 times per day (cashews, apples, yogurt parfait)   Physical activity: busy at work ()     Past medical history, past surgical history, family history, and social history was reviewed during this encounter.    No Known Allergies   Current Outpatient Medications on File Prior to Visit   Medication Sig Dispense Refill    acetaminophen (TYLENOL) 325 MG tablet Take 1 tablet by mouth Every 6 (Six) Hours As Needed for Mild Pain.      ferrous sulfate 325 (65 FE) MG tablet Take 1 tablet by mouth Daily With Breakfast.      Pediatric Multiple Vit-C-FA (Flinstones Gummies Omega-3 DHA) chewable tablet Chew 2 each.      [DISCONTINUED] cefuroxime (CEFTIN) 500 MG tablet Take 1 tablet by mouth 2 (Two) Times a Day. 14 tablet 0     No current facility-administered medications on file prior to visit.        Review of Systems   Constitutional:  Positive for fatigue.   HENT:  Negative for trouble swallowing and voice change.    Eyes:  Negative for visual disturbance.   Respiratory:  Negative for shortness of breath.   "  Cardiovascular:  Negative for chest pain.   Gastrointestinal:  Negative for abdominal pain.   Endocrine: Negative for cold intolerance and heat intolerance.   Musculoskeletal:  Negative for gait problem.   Skin:  Negative for rash.   Neurological:  Negative for numbness.   Psychiatric/Behavioral:  Negative for agitation.         /66 (BP Location: Left arm, Patient Position: Sitting, Cuff Size: Adult)   Pulse 102   Ht 162 cm (63.78\")   Wt 83.7 kg (184 lb 8 oz)   LMP 05/23/2024 (Exact Date)   SpO2 100%   BMI 31.89 kg/m²      Physical Exam  Vitals reviewed.   Constitutional:       General: She is not in acute distress.  HENT:      Head: Normocephalic.      Nose: Nose normal.   Eyes:      Conjunctiva/sclera: Conjunctivae normal.   Cardiovascular:      Rate and Rhythm: Normal rate.   Pulmonary:      Effort: Pulmonary effort is normal.   Abdominal:      Tenderness: There is no guarding.   Musculoskeletal:         General: No deformity.   Skin:     General: Skin is warm.   Neurological:      Mental Status: She is alert and oriented to person, place, and time.   Psychiatric:         Mood and Affect: Mood normal.       LABS AND IMAGING  CMP:  Lab Results   Component Value Date    Glucose 123 12/20/2024    Glucose 250 (A) 07/31/2024    Glucose, UA Negative 09/03/2024    BUN 10 08/12/2024    BUN/Creatinine Ratio 19.6 08/12/2024    Creatinine 0.51 (L) 08/12/2024    Creatinine, 24H 0.70 04/12/2021    Creatinine, Urine 149.5 12/09/2024    Ketones, UA 15 mg/dL (1+) (A) 09/03/2024    CO2 20.4 (L) 08/12/2024    CO2 Content 21.3 (L) 04/15/2021    Calcium 9.2 08/12/2024    Albumin 4.1 08/12/2024    AST (SGOT) 19 08/12/2024    ALT (SGPT) 17 08/12/2024     Assessment and Plan    Diagnoses and all orders for this visit:    1. Diet controlled gestational diabetes mellitus (GDM) in third trimester (Primary)  Assessment & Plan:  -Given normal fasting and 1 hour glucose with 3-hour OGTT I am hopeful that with some dietary " changes we can manage her gestational diabetes through diet and physical activity alone  -Patient has a lot of work to do with following a healthier diet  -Discussed trying to get 3 meals per day and 2 snacks per day; increasing lean protein and fiber in diet and decreasing sugars and processed foods  -Discussed BG goals during pregnancy (fasting <95, 1 hr post prandial <140, 2 hr post prandial < 120). Pt to instructed to check pre- and post-meal and at bedtime, keep log, and message me log weekly   -Discussed carb counting and diet restrictions per meal. Carb allowance per meal given to patient.   -Written instructions given to patient   -Discussed risks of uncontrolled GDM to the baby including: Excessive birth weight; Early () birth; respiratory distress syndrome; hypoglycemia; Obesity and type 2 diabetes later in life; and Stillbirth. And discussed risks of uncontrolled GDM to the mother including: High blood pressure and preeclampsia; Having a ; and future diabetes.   -Patient should be screened for T2DM annually after delivery    Orders:  -     POC Glycosylated Hemoglobin (Hb A1C)  -     POC Glucose, Blood  -     glucose monitor monitoring kit; Use to monitor BG up to 4 times daily  Dispense: 1 each; Refill: 0  -     glucose blood test strip; 4 times daily  Dispense: 400 each; Refill: 0  -     Lancets misc; Use 1 each 4 (Four) Times a Day.  Dispense: 400 each; Refill: 0    Other orders  -     Cancel: POC Glycosylated Hemoglobin (Hb A1C)  -     Cancel: POC Glucose, Blood         Return for As needed pending lab results. The patient was instructed to contact the clinic with any interval questions or concerns.    Electronically signed by Kyle S Rosenstein, MD

## 2024-12-20 ENCOUNTER — OFFICE VISIT (OUTPATIENT)
Dept: ENDOCRINOLOGY | Facility: CLINIC | Age: 24
End: 2024-12-20
Payer: MEDICAID

## 2024-12-20 VITALS
HEART RATE: 102 BPM | DIASTOLIC BLOOD PRESSURE: 66 MMHG | SYSTOLIC BLOOD PRESSURE: 122 MMHG | HEIGHT: 64 IN | WEIGHT: 184.5 LBS | OXYGEN SATURATION: 100 % | BODY MASS INDEX: 31.5 KG/M2

## 2024-12-20 DIAGNOSIS — O24.410 DIET CONTROLLED GESTATIONAL DIABETES MELLITUS (GDM) IN THIRD TRIMESTER: Primary | ICD-10-CM

## 2024-12-20 LAB
EXPIRATION DATE: ABNORMAL
EXPIRATION DATE: NORMAL
GLUCOSE BLDC GLUCOMTR-MCNC: 123 MG/DL (ref 70–130)
HBA1C MFR BLD: 6.1 % (ref 4.5–5.7)
Lab: ABNORMAL
Lab: NORMAL

## 2024-12-20 RX ORDER — LANCETS 30 GAUGE
1 EACH MISCELLANEOUS 4 TIMES DAILY
Qty: 400 EACH | Refills: 0 | Status: SHIPPED | OUTPATIENT
Start: 2024-12-20

## 2024-12-20 RX ORDER — BLOOD-GLUCOSE METER
KIT MISCELLANEOUS
Qty: 1 EACH | Refills: 0 | Status: SHIPPED | OUTPATIENT
Start: 2024-12-20

## 2024-12-20 NOTE — ASSESSMENT & PLAN NOTE
-Given normal fasting and 1 hour glucose with 3-hour OGTT I am hopeful that with some dietary changes we can manage her gestational diabetes through diet and physical activity alone  -Patient has a lot of work to do with following a healthier diet  -Discussed trying to get 3 meals per day and 2 snacks per day; increasing lean protein and fiber in diet and decreasing sugars and processed foods  -Discussed BG goals during pregnancy (fasting <95, 1 hr post prandial <140, 2 hr post prandial < 120). Pt to instructed to check pre- and post-meal and at bedtime, keep log, and message me log weekly   -Discussed carb counting and diet restrictions per meal. Carb allowance per meal given to patient.   -Written instructions given to patient   -Discussed risks of uncontrolled GDM to the baby including: Excessive birth weight; Early () birth; respiratory distress syndrome; hypoglycemia; Obesity and type 2 diabetes later in life; and Stillbirth. And discussed risks of uncontrolled GDM to the mother including: High blood pressure and preeclampsia; Having a ; and future diabetes.   -Patient should be screened for T2DM annually after delivery

## 2025-02-03 LAB — EXTERNAL GROUP B STREP ANTIGEN: POSITIVE

## 2025-02-12 ENCOUNTER — PREP FOR SURGERY (OUTPATIENT)
Dept: OTHER | Facility: HOSPITAL | Age: 25
End: 2025-02-12
Payer: MEDICAID

## 2025-02-12 ENCOUNTER — HOSPITAL ENCOUNTER (OUTPATIENT)
Facility: HOSPITAL | Age: 25
Discharge: HOME | End: 2025-02-12
Attending: OBSTETRICS & GYNECOLOGY | Admitting: OBSTETRICS & GYNECOLOGY
Payer: MEDICAID

## 2025-02-12 VITALS
HEART RATE: 74 BPM | RESPIRATION RATE: 16 BRPM | SYSTOLIC BLOOD PRESSURE: 112 MMHG | HEIGHT: 64 IN | OXYGEN SATURATION: 100 % | WEIGHT: 198 LBS | DIASTOLIC BLOOD PRESSURE: 68 MMHG | BODY MASS INDEX: 33.8 KG/M2

## 2025-02-12 DIAGNOSIS — O24.410 DIET CONTROLLED GESTATIONAL DIABETES MELLITUS (GDM) IN THIRD TRIMESTER: Primary | ICD-10-CM

## 2025-02-12 PROCEDURE — 99203 OFFICE O/P NEW LOW 30 MIN: CPT | Performed by: OBSTETRICS & GYNECOLOGY

## 2025-02-12 PROCEDURE — G0463 HOSPITAL OUTPT CLINIC VISIT: HCPCS

## 2025-02-12 PROCEDURE — 59025 FETAL NON-STRESS TEST: CPT

## 2025-02-12 PROCEDURE — 59025 FETAL NON-STRESS TEST: CPT | Performed by: OBSTETRICS & GYNECOLOGY

## 2025-02-12 RX ORDER — OXYCODONE AND ACETAMINOPHEN 5; 325 MG/1; MG/1
1 TABLET ORAL EVERY 4 HOURS PRN
Status: CANCELLED | OUTPATIENT
Start: 2025-02-12 | End: 2025-02-22

## 2025-02-12 RX ORDER — ACETAMINOPHEN 325 MG/1
650 TABLET ORAL EVERY 4 HOURS PRN
Status: CANCELLED | OUTPATIENT
Start: 2025-02-12

## 2025-02-12 RX ORDER — SODIUM CHLORIDE 0.9 % (FLUSH) 0.9 %
10 SYRINGE (ML) INJECTION AS NEEDED
Status: CANCELLED | OUTPATIENT
Start: 2025-02-12

## 2025-02-12 RX ORDER — MISOPROSTOL 200 UG/1
800 TABLET ORAL AS NEEDED
Status: CANCELLED | OUTPATIENT
Start: 2025-02-12

## 2025-02-12 RX ORDER — ONDANSETRON 2 MG/ML
4 INJECTION INTRAMUSCULAR; INTRAVENOUS EVERY 6 HOURS PRN
Status: CANCELLED | OUTPATIENT
Start: 2025-02-12

## 2025-02-12 RX ORDER — BUTORPHANOL TARTRATE 2 MG/ML
2 INJECTION, SOLUTION INTRAMUSCULAR; INTRAVENOUS
Status: CANCELLED | OUTPATIENT
Start: 2025-02-12

## 2025-02-12 RX ORDER — OXYTOCIN/0.9 % SODIUM CHLORIDE 30/500 ML
999 PLASTIC BAG, INJECTION (ML) INTRAVENOUS ONCE
Status: CANCELLED | OUTPATIENT
Start: 2025-02-12 | End: 2025-02-12

## 2025-02-12 RX ORDER — CARBOPROST TROMETHAMINE 250 UG/ML
250 INJECTION, SOLUTION INTRAMUSCULAR AS NEEDED
Status: CANCELLED | OUTPATIENT
Start: 2025-02-12

## 2025-02-12 RX ORDER — TERBUTALINE SULFATE 1 MG/ML
0.25 INJECTION, SOLUTION SUBCUTANEOUS AS NEEDED
Status: CANCELLED | OUTPATIENT
Start: 2025-02-12

## 2025-02-12 RX ORDER — PENICILLIN G 3000000 [IU]/50ML
3 INJECTION, SOLUTION INTRAVENOUS EVERY 4 HOURS
Status: CANCELLED | OUTPATIENT
Start: 2025-02-12 | End: 2025-02-14

## 2025-02-12 RX ORDER — ONDANSETRON 4 MG/1
4 TABLET, ORALLY DISINTEGRATING ORAL EVERY 6 HOURS PRN
Status: CANCELLED | OUTPATIENT
Start: 2025-02-12

## 2025-02-12 RX ORDER — OXYCODONE AND ACETAMINOPHEN 10; 325 MG/1; MG/1
2 TABLET ORAL EVERY 4 HOURS PRN
Status: CANCELLED | OUTPATIENT
Start: 2025-02-12 | End: 2025-02-22

## 2025-02-12 RX ORDER — OXYTOCIN/0.9 % SODIUM CHLORIDE 30/500 ML
250 PLASTIC BAG, INJECTION (ML) INTRAVENOUS CONTINUOUS
Status: CANCELLED | OUTPATIENT
Start: 2025-02-12 | End: 2025-02-12

## 2025-02-12 RX ORDER — OXYTOCIN/0.9 % SODIUM CHLORIDE 30/500 ML
2-20 PLASTIC BAG, INJECTION (ML) INTRAVENOUS
Status: CANCELLED | OUTPATIENT
Start: 2025-02-12

## 2025-02-12 RX ORDER — IBUPROFEN 600 MG/1
600 TABLET, FILM COATED ORAL EVERY 6 HOURS PRN
Status: CANCELLED | OUTPATIENT
Start: 2025-02-12

## 2025-02-12 RX ORDER — SODIUM CHLORIDE, SODIUM LACTATE, POTASSIUM CHLORIDE, CALCIUM CHLORIDE 600; 310; 30; 20 MG/100ML; MG/100ML; MG/100ML; MG/100ML
125 INJECTION, SOLUTION INTRAVENOUS CONTINUOUS
Status: CANCELLED | OUTPATIENT
Start: 2025-02-12 | End: 2025-02-13

## 2025-02-12 RX ORDER — SODIUM CHLORIDE 0.9 % (FLUSH) 0.9 %
10 SYRINGE (ML) INJECTION EVERY 12 HOURS SCHEDULED
Status: CANCELLED | OUTPATIENT
Start: 2025-02-12

## 2025-02-12 RX ORDER — SODIUM CHLORIDE 9 MG/ML
40 INJECTION, SOLUTION INTRAVENOUS AS NEEDED
Status: CANCELLED | OUTPATIENT
Start: 2025-02-12

## 2025-02-12 RX ORDER — LIDOCAINE HYDROCHLORIDE 10 MG/ML
0.5 INJECTION, SOLUTION EPIDURAL; INFILTRATION; INTRACAUDAL; PERINEURAL ONCE AS NEEDED
Status: CANCELLED | OUTPATIENT
Start: 2025-02-12

## 2025-02-12 RX ORDER — METHYLERGONOVINE MALEATE 0.2 MG/ML
200 INJECTION INTRAVENOUS ONCE AS NEEDED
Status: CANCELLED | OUTPATIENT
Start: 2025-02-12

## 2025-02-12 RX ORDER — BUTORPHANOL TARTRATE 1 MG/ML
1 INJECTION, SOLUTION INTRAMUSCULAR; INTRAVENOUS
Status: CANCELLED | OUTPATIENT
Start: 2025-02-12

## 2025-02-12 RX ORDER — MAGNESIUM CARB/ALUMINUM HYDROX 105-160MG
30 TABLET,CHEWABLE ORAL ONCE
Status: CANCELLED | OUTPATIENT
Start: 2025-02-12 | End: 2025-02-12

## 2025-02-12 NOTE — H&P
[facility]  Obstetric History and Physical    Chief Complaint   Patient presents with    Contractions       HPI:      Patient is a 24 y.o. female  currently at 37w6d, who presents with contractions that had been more frequent and stronger before her arrival.   Since being here, they have spaced out and are less intense.  She denies vaginal leaking and bleeding.   +FM.  She has an induction scheduled for next week.   No cervical change from admission to recheck.         The following portions of the patients history were reviewed and updated as appropriate: current medications, allergies, past medical history, past surgical history, past family history, past social history and problem list .       Prenatal Information:   Prenatal Labs  Lab Results   Component Value Date    HEPBSAG Non-Reactive 2024    ABORH A Positive 2024    ABO A 2024    RH Positive 2024    ABSCRN Negative 2024    HEPCVIRUSABY Non-Reactive 10/30/2020         External Prenatal Results       Pregnancy Outside Results - Transcribed From Office Records - See Scanned Records For Details       Test Value Date Time    ABO  A  24 1722    Rh  Positive  24 1722    Antibody Screen  Negative  24 1608       Negative  24 1722    Varicella IgG       Rubella  2.79 index 24 1722    Hgb  10.5 g/dL 24 1608       11.3 g/dL 24 1722      ^ 11.3 g/dL 24 1930    Hct  31.9 % 24 1608       34.6 % 24 1722      ^ 33.5 % 24 1930    HgB A1c   6.1 % 24 1458    1h GTT  147 mg/dL 24 1608    3h GTT Fasting  87 mg/dL 24 1207    3h GTT 1 hour  176 mg/dL 24 1318    3h GTT 2 hour  186 mg/dL 24 1432    3h GTT 3 hour   171 mg/dL 24 1518    Gonorrhea (discrete)       Chlamydia (discrete)       RPR       Syphils cascade: TP-Ab (FTA)  Non-Reactive  24 1608       Non-Reactive  24 1722    TP-Ab  Non-Reactive  24 1608       Non-Reactive   24 1722    TP-Ab (EIA)       TPPA       HBsAg  Non-Reactive  24 1722    Herpes Simplex Virus PCR       Herpes Simplex VIrus Culture       HIV  Non-Reactive  24 1722    Hep C RNA Quant PCR       Hep C Antibody       AFP       NIPT       Cystic Fibrosis (Reddy)       Cystic Fibroisis        Spinal Muscular atrophy       Fragile X       Group B Strep       GBS Susceptibility to Clindamycin       GBS Susceptibility to Erythromycin       Fetal Fibronectin       Genetic Testing, Maternal Blood                 Drug Screening       Test Value Date Time    Urine Drug Screen       Amphetamine Screen  Negative  24 1608    Barbiturate Screen  Negative  24 1608    Benzodiazepine Screen  Negative  24 1608    Methadone Screen  Negative  24 1608    Phencyclidine Screen  Negative  24 1608    Opiates Screen  Negative  24 1608    THC Screen  Negative  24 1608    Cocaine Screen       Propoxyphene Screen       Buprenorphine Screen  Negative  24 1608    Methamphetamine Screen       Oxycodone Screen  Negative  24 1608    Tricyclic Antidepressants Screen  Negative  24 1608              Legend    ^: Historical                              Past OB History:       OB History    Para Term  AB Living   2 1 0 1 0 1   SAB IAB Ectopic Molar Multiple Live Births   0 0 0 0 0 1      # Outcome Date GA Lbr Abner/2nd Weight Sex Type Anes PTL Lv   2 Current            1  04/15/21 36w2d  2990 g (6 lb 9.5 oz) F Vag-Spont EPI Y GIULIA      Name: REJI FOXLIZETHCHRISTYANGEL      Apgar1: 3  Apgar5: 6       Past Medical History: Past Medical History:   Diagnosis Date    Asthma     Seasonal allergies     Urinary tract infection       Past Surgical History History reviewed. No pertinent surgical history.   Family History: Family History   Problem Relation Age of Onset    No Known Problems Mother     No Known Problems Father       Social History:  reports that she has never  smoked. She has never used smokeless tobacco.   reports no history of alcohol use.   reports no history of drug use.            Objective       Vital Signs Range for the last 24 hours  Temperature:     Temp Source:     BP: BP: (112)/(68) 112/68   Pulse: Heart Rate:  [74] 74   Respirations: Resp:  [16] 16   SPO2: SpO2:  [100 %] 100 %   O2 Amount (l/min):     O2 Devices     Weight: Weight:  [89.8 kg (198 lb)] 89.8 kg (198 lb)     Physical Examination: Alert and oriented X 3  Chest  - Breathing is unlabored   Heart -Regular rate.   Abdomen - no rebound tenderness noted, gravid uterus   Extremities - Non-tender bilaterally        Cervix: Exam by:  RFranNFran    Dilation:  2   Effacement:  50   Station:  -3       Fetal Heart Rate Assessment   Method:     Beats/min:     Baseline:  150s   Varibility:  mod   Accels:  y   Decels:  n   Tracing Category:  1     Uterine Assessment   Method:     Frequency (min):  Irregular    Ctx Count in 10 min:     Duration:     Intensity:  Mild    Intensity by IUPC:     Resting Tone:     Resting Tone by IUPC:     Luis Angel Units:       rosanna Manrique  at 37w6d with an LANA of 2025, by Patient Reported,  Non stress test.    Indication False labour   Start time 1455  End time 1525  15 x 15 accelerations x 2  Yes  No decelerations  Baseline   150s  Reactive NST  Yes          Assessment:  1.  Intrauterine pregnancy at 37w6d gestation with reactive fetal status.    2.  False labour not ruptured  3.  Gestational diabetes   Plan:  1. FHTs  Reactive NST  2. No cervical change or signs and symptoms of SROM or labour  3. Reviewed labour guidelines  4. All questions have been answered.  5. Discharge home with routine OB follow-up      Fareed Shetty MD  2025  15:32 EST

## 2025-02-13 ENCOUNTER — ANESTHESIA EVENT (OUTPATIENT)
Dept: LABOR AND DELIVERY | Facility: HOSPITAL | Age: 25
End: 2025-02-13
Payer: MEDICAID

## 2025-02-13 ENCOUNTER — HOSPITAL ENCOUNTER (INPATIENT)
Facility: HOSPITAL | Age: 25
LOS: 3 days | Discharge: HOME | End: 2025-02-16
Attending: OBSTETRICS & GYNECOLOGY | Admitting: OBSTETRICS & GYNECOLOGY
Payer: MEDICAID

## 2025-02-13 ENCOUNTER — ANESTHESIA (OUTPATIENT)
Dept: LABOR AND DELIVERY | Facility: HOSPITAL | Age: 25
End: 2025-02-13
Payer: MEDICAID

## 2025-02-13 DIAGNOSIS — O24.410 DIET CONTROLLED GESTATIONAL DIABETES MELLITUS (GDM) IN THIRD TRIMESTER: ICD-10-CM

## 2025-02-13 LAB
DEPRECATED RDW RBC AUTO: 39.4 FL (ref 37–54)
ERYTHROCYTE [DISTWIDTH] IN BLOOD BY AUTOMATED COUNT: 14.4 % (ref 12.3–15.4)
HCT VFR BLD AUTO: 37.9 % (ref 34–46.6)
HGB BLD-MCNC: 12.2 G/DL (ref 12–15.9)
MCH RBC QN AUTO: 24.5 PG (ref 26.6–33)
MCHC RBC AUTO-ENTMCNC: 32.2 G/DL (ref 31.5–35.7)
MCV RBC AUTO: 76.3 FL (ref 79–97)
PLATELET # BLD AUTO: 309 10*3/MM3 (ref 140–450)
PMV BLD AUTO: 10.6 FL (ref 6–12)
RBC # BLD AUTO: 4.97 10*6/MM3 (ref 3.77–5.28)
WBC NRBC COR # BLD AUTO: 11.9 10*3/MM3 (ref 3.4–10.8)

## 2025-02-13 PROCEDURE — 25010000002 PENICILLIN G POTASSIUM PER 600000 UNITS: Performed by: OBSTETRICS & GYNECOLOGY

## 2025-02-13 PROCEDURE — 86780 TREPONEMA PALLIDUM: CPT | Performed by: OBSTETRICS & GYNECOLOGY

## 2025-02-13 PROCEDURE — C1755 CATHETER, INTRASPINAL: HCPCS | Performed by: ANESTHESIOLOGY

## 2025-02-13 PROCEDURE — C1755 CATHETER, INTRASPINAL: HCPCS

## 2025-02-13 PROCEDURE — 86901 BLOOD TYPING SEROLOGIC RH(D): CPT | Performed by: OBSTETRICS & GYNECOLOGY

## 2025-02-13 PROCEDURE — 85027 COMPLETE CBC AUTOMATED: CPT | Performed by: OBSTETRICS & GYNECOLOGY

## 2025-02-13 PROCEDURE — 25010000002 ROPIVACAINE PER 1 MG: Performed by: ANESTHESIOLOGY

## 2025-02-13 PROCEDURE — 86900 BLOOD TYPING SEROLOGIC ABO: CPT | Performed by: OBSTETRICS & GYNECOLOGY

## 2025-02-13 PROCEDURE — 59025 FETAL NON-STRESS TEST: CPT

## 2025-02-13 PROCEDURE — 25010000002 LIDOCAINE-EPINEPHRINE (PF) 1.5 %-1:200000 SOLUTION: Performed by: ANESTHESIOLOGY

## 2025-02-13 PROCEDURE — 86850 RBC ANTIBODY SCREEN: CPT | Performed by: OBSTETRICS & GYNECOLOGY

## 2025-02-13 PROCEDURE — 25010000002 FENTANYL CITRATE (PF) 50 MCG/ML SOLUTION: Performed by: ANESTHESIOLOGY

## 2025-02-13 PROCEDURE — 25010000002 BUPIVACAINE (PF) 0.25 % SOLUTION: Performed by: ANESTHESIOLOGY

## 2025-02-13 PROCEDURE — 25810000003 LACTATED RINGERS SOLUTION: Performed by: OBSTETRICS & GYNECOLOGY

## 2025-02-13 RX ORDER — ONDANSETRON 4 MG/1
4 TABLET, ORALLY DISINTEGRATING ORAL EVERY 6 HOURS PRN
Status: DISCONTINUED | OUTPATIENT
Start: 2025-02-13 | End: 2025-02-14 | Stop reason: HOSPADM

## 2025-02-13 RX ORDER — ACETAMINOPHEN 325 MG/1
650 TABLET ORAL EVERY 4 HOURS PRN
Status: DISCONTINUED | OUTPATIENT
Start: 2025-02-13 | End: 2025-02-14 | Stop reason: HOSPADM

## 2025-02-13 RX ORDER — SODIUM CHLORIDE 9 MG/ML
40 INJECTION, SOLUTION INTRAVENOUS AS NEEDED
Status: DISCONTINUED | OUTPATIENT
Start: 2025-02-13 | End: 2025-02-14 | Stop reason: HOSPADM

## 2025-02-13 RX ORDER — BUPIVACAINE HYDROCHLORIDE 2.5 MG/ML
INJECTION, SOLUTION EPIDURAL; INFILTRATION; INTRACAUDAL AS NEEDED
Status: DISCONTINUED | OUTPATIENT
Start: 2025-02-13 | End: 2025-02-14 | Stop reason: SURG

## 2025-02-13 RX ORDER — BUTORPHANOL TARTRATE 1 MG/ML
1 INJECTION, SOLUTION INTRAMUSCULAR; INTRAVENOUS
Status: DISCONTINUED | OUTPATIENT
Start: 2025-02-13 | End: 2025-02-14 | Stop reason: HOSPADM

## 2025-02-13 RX ORDER — CITRIC ACID/SODIUM CITRATE 334-500MG
30 SOLUTION, ORAL ORAL ONCE
Status: DISCONTINUED | OUTPATIENT
Start: 2025-02-14 | End: 2025-02-14 | Stop reason: HOSPADM

## 2025-02-13 RX ORDER — BUTORPHANOL TARTRATE 2 MG/ML
2 INJECTION, SOLUTION INTRAMUSCULAR; INTRAVENOUS
Status: DISCONTINUED | OUTPATIENT
Start: 2025-02-13 | End: 2025-02-14 | Stop reason: HOSPADM

## 2025-02-13 RX ORDER — ONDANSETRON 2 MG/ML
4 INJECTION INTRAMUSCULAR; INTRAVENOUS EVERY 6 HOURS PRN
Status: DISCONTINUED | OUTPATIENT
Start: 2025-02-13 | End: 2025-02-14 | Stop reason: HOSPADM

## 2025-02-13 RX ORDER — EPHEDRINE SULFATE 5 MG/ML
10 INJECTION INTRAVENOUS
Status: DISCONTINUED | OUTPATIENT
Start: 2025-02-13 | End: 2025-02-14 | Stop reason: HOSPADM

## 2025-02-13 RX ORDER — LIDOCAINE HYDROCHLORIDE 10 MG/ML
0.5 INJECTION, SOLUTION EPIDURAL; INFILTRATION; INTRACAUDAL; PERINEURAL ONCE AS NEEDED
Status: DISCONTINUED | OUTPATIENT
Start: 2025-02-13 | End: 2025-02-14 | Stop reason: HOSPADM

## 2025-02-13 RX ORDER — MAGNESIUM CARB/ALUMINUM HYDROX 105-160MG
30 TABLET,CHEWABLE ORAL ONCE
Status: DISCONTINUED | OUTPATIENT
Start: 2025-02-13 | End: 2025-02-14 | Stop reason: HOSPADM

## 2025-02-13 RX ORDER — DIPHENHYDRAMINE HYDROCHLORIDE 50 MG/ML
12.5 INJECTION INTRAMUSCULAR; INTRAVENOUS EVERY 8 HOURS PRN
Status: DISCONTINUED | OUTPATIENT
Start: 2025-02-13 | End: 2025-02-14 | Stop reason: HOSPADM

## 2025-02-13 RX ORDER — FENTANYL CITRATE 50 UG/ML
INJECTION, SOLUTION INTRAMUSCULAR; INTRAVENOUS AS NEEDED
Status: DISCONTINUED | OUTPATIENT
Start: 2025-02-13 | End: 2025-02-14 | Stop reason: SURG

## 2025-02-13 RX ORDER — METOCLOPRAMIDE HYDROCHLORIDE 5 MG/ML
10 INJECTION INTRAMUSCULAR; INTRAVENOUS ONCE AS NEEDED
Status: DISCONTINUED | OUTPATIENT
Start: 2025-02-13 | End: 2025-02-14 | Stop reason: HOSPADM

## 2025-02-13 RX ORDER — OXYTOCIN/0.9 % SODIUM CHLORIDE 30/500 ML
2-20 PLASTIC BAG, INJECTION (ML) INTRAVENOUS
Status: DISCONTINUED | OUTPATIENT
Start: 2025-02-13 | End: 2025-02-14 | Stop reason: HOSPADM

## 2025-02-13 RX ORDER — LIDOCAINE HYDROCHLORIDE AND EPINEPHRINE 15; 5 MG/ML; UG/ML
INJECTION, SOLUTION EPIDURAL AS NEEDED
Status: DISCONTINUED | OUTPATIENT
Start: 2025-02-13 | End: 2025-02-14 | Stop reason: SURG

## 2025-02-13 RX ORDER — SODIUM CHLORIDE, SODIUM LACTATE, POTASSIUM CHLORIDE, CALCIUM CHLORIDE 600; 310; 30; 20 MG/100ML; MG/100ML; MG/100ML; MG/100ML
125 INJECTION, SOLUTION INTRAVENOUS CONTINUOUS
Status: DISCONTINUED | OUTPATIENT
Start: 2025-02-13 | End: 2025-02-14

## 2025-02-13 RX ORDER — SODIUM CHLORIDE 0.9 % (FLUSH) 0.9 %
10 SYRINGE (ML) INJECTION EVERY 12 HOURS SCHEDULED
Status: DISCONTINUED | OUTPATIENT
Start: 2025-02-13 | End: 2025-02-14 | Stop reason: HOSPADM

## 2025-02-13 RX ORDER — PENICILLIN G 3000000 [IU]/50ML
3 INJECTION, SOLUTION INTRAVENOUS EVERY 4 HOURS
Status: DISCONTINUED | OUTPATIENT
Start: 2025-02-14 | End: 2025-02-14 | Stop reason: HOSPADM

## 2025-02-13 RX ORDER — ONDANSETRON 2 MG/ML
4 INJECTION INTRAMUSCULAR; INTRAVENOUS ONCE AS NEEDED
Status: DISCONTINUED | OUTPATIENT
Start: 2025-02-13 | End: 2025-02-14 | Stop reason: HOSPADM

## 2025-02-13 RX ORDER — ROPIVACAINE HYDROCHLORIDE 2 MG/ML
15 INJECTION, SOLUTION EPIDURAL; INFILTRATION; PERINEURAL CONTINUOUS
Status: DISCONTINUED | OUTPATIENT
Start: 2025-02-14 | End: 2025-02-14

## 2025-02-13 RX ORDER — SODIUM CHLORIDE 0.9 % (FLUSH) 0.9 %
10 SYRINGE (ML) INJECTION AS NEEDED
Status: DISCONTINUED | OUTPATIENT
Start: 2025-02-13 | End: 2025-02-14 | Stop reason: HOSPADM

## 2025-02-13 RX ORDER — TERBUTALINE SULFATE 1 MG/ML
0.25 INJECTION SUBCUTANEOUS AS NEEDED
Status: DISCONTINUED | OUTPATIENT
Start: 2025-02-13 | End: 2025-02-14 | Stop reason: HOSPADM

## 2025-02-13 RX ORDER — FAMOTIDINE 10 MG/ML
20 INJECTION, SOLUTION INTRAVENOUS ONCE AS NEEDED
Status: COMPLETED | OUTPATIENT
Start: 2025-02-13 | End: 2025-02-14

## 2025-02-13 RX ADMIN — LIDOCAINE HYDROCHLORIDE AND EPINEPHRINE 3 ML: 15; 5 INJECTION, SOLUTION EPIDURAL at 23:31

## 2025-02-13 RX ADMIN — SODIUM CHLORIDE 5 MILLION UNITS: 900 INJECTION INTRAVENOUS at 23:26

## 2025-02-13 RX ADMIN — FENTANYL CITRATE 100 MCG: 50 INJECTION, SOLUTION INTRAMUSCULAR; INTRAVENOUS at 23:33

## 2025-02-13 RX ADMIN — BUPIVACAINE HYDROCHLORIDE 8 ML: 2.5 INJECTION, SOLUTION EPIDURAL; INFILTRATION; INTRACAUDAL; PERINEURAL at 23:33

## 2025-02-13 RX ADMIN — LIDOCAINE HYDROCHLORIDE AND EPINEPHRINE 2 ML: 15; 5 INJECTION, SOLUTION EPIDURAL at 23:32

## 2025-02-13 RX ADMIN — ROPIVACAINE HYDROCHLORIDE 14 ML/HR: 2 INJECTION, SOLUTION EPIDURAL; INFILTRATION at 23:37

## 2025-02-13 RX ADMIN — SODIUM CHLORIDE, POTASSIUM CHLORIDE, SODIUM LACTATE AND CALCIUM CHLORIDE 1000 ML: 600; 310; 30; 20 INJECTION, SOLUTION INTRAVENOUS at 23:07

## 2025-02-14 LAB
ABO GROUP BLD: NORMAL
BLD GP AB SCN SERPL QL: NEGATIVE
GLUCOSE BLDC GLUCOMTR-MCNC: 87 MG/DL (ref 70–130)
RH BLD: POSITIVE
T&S EXPIRATION DATE: NORMAL
TREPONEMA PALLIDUM IGG+IGM AB [PRESENCE] IN SERUM OR PLASMA BY IMMUNOASSAY: NORMAL

## 2025-02-14 PROCEDURE — 51702 INSERT TEMP BLADDER CATH: CPT

## 2025-02-14 PROCEDURE — 25810000003 LACTATED RINGERS PER 1000 ML: Performed by: OBSTETRICS & GYNECOLOGY

## 2025-02-14 PROCEDURE — 82948 REAGENT STRIP/BLOOD GLUCOSE: CPT

## 2025-02-14 RX ORDER — BISACODYL 10 MG
10 SUPPOSITORY, RECTAL RECTAL DAILY PRN
Status: DISCONTINUED | OUTPATIENT
Start: 2025-02-15 | End: 2025-02-16 | Stop reason: HOSPADM

## 2025-02-14 RX ORDER — CARBOPROST TROMETHAMINE 250 UG/ML
250 INJECTION, SOLUTION INTRAMUSCULAR AS NEEDED
Status: DISCONTINUED | OUTPATIENT
Start: 2025-02-14 | End: 2025-02-14 | Stop reason: HOSPADM

## 2025-02-14 RX ORDER — ONDANSETRON 2 MG/ML
4 INJECTION INTRAMUSCULAR; INTRAVENOUS EVERY 6 HOURS PRN
Status: DISCONTINUED | OUTPATIENT
Start: 2025-02-14 | End: 2025-02-14 | Stop reason: HOSPADM

## 2025-02-14 RX ORDER — OXYTOCIN/0.9 % SODIUM CHLORIDE 30/500 ML
125 PLASTIC BAG, INJECTION (ML) INTRAVENOUS ONCE AS NEEDED
Status: DISCONTINUED | OUTPATIENT
Start: 2025-02-14 | End: 2025-02-16 | Stop reason: HOSPADM

## 2025-02-14 RX ORDER — OXYTOCIN/0.9 % SODIUM CHLORIDE 30/500 ML
250 PLASTIC BAG, INJECTION (ML) INTRAVENOUS CONTINUOUS
Status: ACTIVE | OUTPATIENT
Start: 2025-02-14 | End: 2025-02-14

## 2025-02-14 RX ORDER — HYDROCODONE BITARTRATE AND ACETAMINOPHEN 5; 325 MG/1; MG/1
1 TABLET ORAL EVERY 4 HOURS PRN
Status: DISCONTINUED | OUTPATIENT
Start: 2025-02-14 | End: 2025-02-16 | Stop reason: HOSPADM

## 2025-02-14 RX ORDER — ACETAMINOPHEN 325 MG/1
650 TABLET ORAL EVERY 6 HOURS PRN
Status: DISCONTINUED | OUTPATIENT
Start: 2025-02-14 | End: 2025-02-16 | Stop reason: HOSPADM

## 2025-02-14 RX ORDER — METHYLERGONOVINE MALEATE 0.2 MG/ML
200 INJECTION INTRAVENOUS ONCE AS NEEDED
Status: DISCONTINUED | OUTPATIENT
Start: 2025-02-14 | End: 2025-02-14 | Stop reason: HOSPADM

## 2025-02-14 RX ORDER — MISOPROSTOL 200 UG/1
800 TABLET ORAL AS NEEDED
Status: DISCONTINUED | OUTPATIENT
Start: 2025-02-14 | End: 2025-02-14 | Stop reason: HOSPADM

## 2025-02-14 RX ORDER — IBUPROFEN 600 MG/1
600 TABLET, FILM COATED ORAL EVERY 6 HOURS PRN
Status: DISCONTINUED | OUTPATIENT
Start: 2025-02-14 | End: 2025-02-16 | Stop reason: HOSPADM

## 2025-02-14 RX ORDER — OXYCODONE AND ACETAMINOPHEN 10; 325 MG/1; MG/1
2 TABLET ORAL EVERY 4 HOURS PRN
Status: DISCONTINUED | OUTPATIENT
Start: 2025-02-14 | End: 2025-02-14 | Stop reason: HOSPADM

## 2025-02-14 RX ORDER — ONDANSETRON 2 MG/ML
4 INJECTION INTRAMUSCULAR; INTRAVENOUS EVERY 6 HOURS PRN
Status: DISCONTINUED | OUTPATIENT
Start: 2025-02-14 | End: 2025-02-16 | Stop reason: HOSPADM

## 2025-02-14 RX ORDER — HYDROCORTISONE 25 MG/G
1 CREAM TOPICAL AS NEEDED
Status: DISCONTINUED | OUTPATIENT
Start: 2025-02-14 | End: 2025-02-16 | Stop reason: HOSPADM

## 2025-02-14 RX ORDER — OXYTOCIN/0.9 % SODIUM CHLORIDE 30/500 ML
999 PLASTIC BAG, INJECTION (ML) INTRAVENOUS ONCE
Status: COMPLETED | OUTPATIENT
Start: 2025-02-14 | End: 2025-02-14

## 2025-02-14 RX ORDER — HYDROCODONE BITARTRATE AND ACETAMINOPHEN 10; 325 MG/1; MG/1
1 TABLET ORAL EVERY 4 HOURS PRN
Status: DISCONTINUED | OUTPATIENT
Start: 2025-02-14 | End: 2025-02-16 | Stop reason: HOSPADM

## 2025-02-14 RX ORDER — PRENATAL VIT/IRON FUM/FOLIC AC 27MG-0.8MG
1 TABLET ORAL DAILY
Status: DISCONTINUED | OUTPATIENT
Start: 2025-02-14 | End: 2025-02-16 | Stop reason: HOSPADM

## 2025-02-14 RX ORDER — DOCUSATE SODIUM 100 MG/1
100 CAPSULE, LIQUID FILLED ORAL 2 TIMES DAILY
Status: DISCONTINUED | OUTPATIENT
Start: 2025-02-14 | End: 2025-02-16 | Stop reason: HOSPADM

## 2025-02-14 RX ORDER — IBUPROFEN 600 MG/1
600 TABLET, FILM COATED ORAL EVERY 6 HOURS PRN
Status: DISCONTINUED | OUTPATIENT
Start: 2025-02-14 | End: 2025-02-14 | Stop reason: HOSPADM

## 2025-02-14 RX ORDER — ACETAMINOPHEN 325 MG/1
650 TABLET ORAL EVERY 4 HOURS PRN
Status: DISCONTINUED | OUTPATIENT
Start: 2025-02-14 | End: 2025-02-14 | Stop reason: HOSPADM

## 2025-02-14 RX ORDER — SODIUM CHLORIDE 0.9 % (FLUSH) 0.9 %
1-10 SYRINGE (ML) INJECTION AS NEEDED
Status: DISCONTINUED | OUTPATIENT
Start: 2025-02-14 | End: 2025-02-16 | Stop reason: HOSPADM

## 2025-02-14 RX ORDER — ONDANSETRON 4 MG/1
4 TABLET, ORALLY DISINTEGRATING ORAL EVERY 6 HOURS PRN
Status: DISCONTINUED | OUTPATIENT
Start: 2025-02-14 | End: 2025-02-14 | Stop reason: HOSPADM

## 2025-02-14 RX ORDER — OXYCODONE AND ACETAMINOPHEN 5; 325 MG/1; MG/1
1 TABLET ORAL EVERY 4 HOURS PRN
Status: DISCONTINUED | OUTPATIENT
Start: 2025-02-14 | End: 2025-02-14 | Stop reason: HOSPADM

## 2025-02-14 RX ADMIN — WITCH HAZEL: 500 SOLUTION RECTAL; TOPICAL at 06:53

## 2025-02-14 RX ADMIN — SODIUM CHLORIDE, POTASSIUM CHLORIDE, SODIUM LACTATE AND CALCIUM CHLORIDE 125 ML/HR: 600; 310; 30; 20 INJECTION, SOLUTION INTRAVENOUS at 00:09

## 2025-02-14 RX ADMIN — HYDROCODONE BITARTRATE AND ACETAMINOPHEN 1 TABLET: 5; 325 TABLET ORAL at 23:56

## 2025-02-14 RX ADMIN — DOCUSATE SODIUM 100 MG: 100 CAPSULE, LIQUID FILLED ORAL at 20:05

## 2025-02-14 RX ADMIN — FAMOTIDINE 20 MG: 10 INJECTION, SOLUTION INTRAVENOUS at 01:23

## 2025-02-14 RX ADMIN — PRENATAL VITAMINS-IRON FUMARATE 27 MG IRON-FOLIC ACID 0.8 MG TABLET 1 TABLET: at 07:54

## 2025-02-14 RX ADMIN — DOCUSATE SODIUM 100 MG: 100 CAPSULE, LIQUID FILLED ORAL at 07:55

## 2025-02-14 RX ADMIN — Medication 1 APPLICATION: at 06:53

## 2025-02-14 RX ADMIN — Medication 10 ML: at 00:10

## 2025-02-14 RX ADMIN — Medication 999 ML/HR: at 03:09

## 2025-02-14 RX ADMIN — Medication: at 06:53

## 2025-02-14 RX ADMIN — Medication 1 APPLICATION: at 20:05

## 2025-02-14 RX ADMIN — HYDROCODONE BITARTRATE AND ACETAMINOPHEN 1 TABLET: 5; 325 TABLET ORAL at 23:11

## 2025-02-14 RX ADMIN — IBUPROFEN 600 MG: 600 TABLET, FILM COATED ORAL at 08:12

## 2025-02-14 RX ADMIN — ACETAMINOPHEN 650 MG: 325 TABLET ORAL at 12:10

## 2025-02-14 NOTE — PAYOR COMM NOTE
"Reji Mills (24 y.o. Female)     From:Jamila José LPN, Utilization Review  Phone #[phone]  Fax #[phone]    Ref#[id number]    Delivery Information.          Date of Birth   2000    Social Security Number       Address   [address] [address] [facility] [address]    Home Phone   [phone]    MRN   [id number]       Shinto   None    Marital Status   Single                            Admission Date   2/13/25    Admission Type   Elective    Admitting Provider   Lexie Phan MD    Attending Provider   Lexie Phan MD    Department, Room/Bed   [facility] MOTHER BABY 4B, N439/1       Discharge Date       Discharge Disposition       Discharge Destination                                 Attending Provider: Lexie Phan MD    Allergies: No Known Allergies    Isolation: None   Infection: None   Code Status: CPR    Ht: 162.6 cm (64\")   Wt: 89.8 kg (198 lb)    Admission Cmt: None   Principal Problem: Gestational diabetes, diet controlled [O24.410]                   Active Insurance as of 2/13/2025       Primary Coverage       Payor Plan Insurance Group Employer/Plan Group    [employer] [facility] COMM[facility] PLAN [facility] PLAN [facility] [place]       Payor Plan Address Payor Plan Phone Number Payor Plan Fax Number Effective Dates    [address]   7/1/2024 - None Entered    [facility] [id number]         Subscriber Name Subscriber Birth Date Member ID       REJI MILLS 2000 [id number]                     Emergency Contacts        (Rel.) Home Phone Work Phone Mobile Phone    HALEIGH JENKINS (Significant Other) [phone] -- [phone]    JANAMARTHA (Mother) [phone] -- [phone]              Insurance Information                  [employer] COMM[employer] PLAN [facility] Phone: --    Subscriber: MillsReji Subscriber#: [id number]    Group#: [id number] Precert#: --    Authorization#: [id number] Effective Date: --      " "       History & Physical        AlinaJasmin cowart JUDIBRADLEY at 25 2322          [address]  Obstetric History and Physical    Chief Complaint   Patient presents with    Laboring       Subjective    Patient is a 24 y.o. female  currently at 38w0d, who presents for term labor  without ROM. She voices good fetal movement. She denies vaginal bleeding and leaking of fluid. She plans epidural for labor and birth.     Her prenatal care is complicated by  GBS + and A1DM.  Her previous obstetric/gynecological history remarkable for preeclampsia.    The following portions of the patients history were reviewed and updated as appropriate: current medications, allergies, past medical history, past surgical history, past family history, past social history, and problem list .       Prenatal Information:   Maternal Prenatal Labs  Blood Type No results found for: \"ABO\"   Rh Status No results found for: \"RH\"   Antibody Screen No results found for: \"ABSCRN\"   Gonnorhea No results found for: \"GCCX\"   Chlamydia No results found for: \"CLAMYDCU\"   RPR No results found for: \"RPR\"   Syphilis Antibody No results found for: \"SYPHILIS\"   Rubella No results found for: \"RUBELLAIGGIN\"   Hepatitis B Surface Antigen No results found for: \"HEPBSAG\"   HIV-1 Antibody No results found for: \"LABHIV1\"   Hepatitis C Antibody No results found for: \"HEPCAB\"   Rapid Urin Drug Screen No results found for: \"AMPMETHU\", \"BARBITSCNUR\", \"LABBENZSCN\", \"LABMETHSCN\", \"LABOPIASCN\", \"THCURSCR\", \"COCAINEUR\", \"AMPHETSCREEN\", \"PROPOXSCN\", \"BUPRENORSCNU\", \"METAMPSCNUR\", \"OXYCODONESCN\", \"TRICYCLICSCN\"   Group B Strep Culture Positive                 Past OB History:       OB History    Para Term  AB Living   2 1 0 1 0 1   SAB IAB Ectopic Molar Multiple Live Births   0 0 0 0 0 1      # Outcome Date GA Lbr Abner/2nd Weight Sex Type Anes PTL Lv   2 Current            1  04/15/21 36w2d  2990 g (6 lb 9.5 oz) F Vag-Spont EPI Y GIULIA      Name: " YULIA FOX      Apgar1: 3  Apgar5: 6       Past Medical History: Past Medical History:   Diagnosis Date    Asthma     Gestational diabetes     A1DM diet controlled    Preeclampsia     G1    Seasonal allergies     Urinary tract infection       Past Surgical History History reviewed. No pertinent surgical history.   Family History: Family History   Problem Relation Age of Onset    No Known Problems Mother     No Known Problems Father       Social History:  reports that she has never smoked. She has never used smokeless tobacco.   reports no history of alcohol use.   reports no history of drug use.        REVIEW OF SYSTEMS             Reports fetal movement is normal             Denies leakage of amniotic fluid.             Denies vaginal bleeding             She reports Regular contractions, frequency: Every 3-4 minutes, intensity strong             All other systems reviewed and are negative    Objective      Vital Signs Range for the last 24 hours  Temperature:     Temp Source:     BP:     Pulse:     Respirations:     SPO2:     O2 Amount (l/min):     O2 Devices     Weight: Weight:  [89.8 kg (198 lb)] 89.8 kg (198 lb)       Presentation: vertex   Cervix: Exam by:  MAYANK   Dilation:  6   Effacement: Cervical Effacement: 80   Station:  -1       Fetal Heart Rate Assessment   Method:  external   Beats/min:     Baseline:  140   Varibility:  moderate   Accels:  present   Decels:  absent   Tracing Category:  1    NST: REACTIVE     Uterine Assessment   Method: Method: palpation   Frequency (min):     Ctx Count in 10 min:     Duration:     Intensity: Contraction Intensity: strong by palpation   Intensity by IUPC:     Resting Tone: Uterine Resting Tone: soft by palpation   Resting Tone by IUPC:     [place] Units:             Constitutional:  Well developed, well nourished, no acute distress, well-groomed.   Respiratory:  Resp e/e/u, normal breath sounds.   Cardiovascular:  Normal rate and rhythm, no murmurs.    Gastrointestinal:  Soft, gravid, nontender.  Uterus: Soft, nontender. Fundus appropriate for dates.  Neurologic:  Alert & oriented x 3,  no focal deficits noted.   Psychiatric:  Speech and behavior appropriate.   Extremities: no cyanosis, clubbing or edema, no evidence of DVT.        Labs:  Lab Results   Component Value Date    WBC 11.90 (H) 02/13/2025    HGB 12.2 02/13/2025    HCT 37.9 02/13/2025    MCV 76.3 (L) 02/13/2025     02/13/2025    POCGLU 123 12/20/2024    CREATININE 0.51 (L) 08/12/2024    URICACID 3.1 08/12/2024    AST 19 08/12/2024    ALT 17 08/12/2024     (H) 08/12/2024               Assessment & Plan      Gestational diabetes, diet controlled        Assessment:  1.  Intrauterine pregnancy at 38w0d weeks gestation with reactive fetal status.    2.   term labor  without ROM  3.  Obstetrical history is non-contributory.  4.  GBS status: Positive    Plan:  1.Expectant management and Antibiotics for GBS prophylaxis  2. Plan of care has been reviewed with patient and questions answered.  3.  Risks, benefits of treatment plan have been discussed.  4.  All questions have been answered.        Jasmin Fuller CNM  2/13/2025  23:22 EST    Electronically signed by Jasmin Fuller CNM at 02/13/25 2327       H&P signed by New Onbase, [place] at 02/14/25 0039         [Media Unavailable] Scan on 2/14/2025 0039 by [employer] On[employer], [place]: Prenatal Records          Electronically signed by New Onbase, [place] at 02/14/25 0039       Facility-Administered Medications as of 2/14/2025   Medication Dose Route Frequency Provider Last Rate Last Admin    acetaminophen (TYLENOL) tablet 650 mg  650 mg Oral Q6H PRN Jasmin Fuller CNM        benzocaine (AMERICAINE) 20 % rectal ointment 1 Application  1 Application Rectal PRN Jasmin Fuller CNM        benzocaine-menthol (DERMOPLAST) 20-0.5 % topical spray   Topical PRN Jasmin Fuller CNM   Given at 02/14/25 0653    [START ON 2/15/2025] bisacodyl (DULCOLAX)  suppository 10 mg  10 mg Rectal Daily PRN Jasmin Fuller CNM        docusate sodium (COLACE) capsule 100 mg  100 mg Oral BID Jasmin Fuller CNM   100 mg at 25 0755    [COMPLETED] famotidine (PEPCID) injection 20 mg  20 mg Intravenous Once PRN Jeremías Aguilar, DO   20 mg at 25 0123    HYDROcodone-acetaminophen (NORCO) 5-325 MG per tablet 1 tablet  1 tablet Oral Q4H PRN AlinaJasmin cowart CNM        Or    HYDROcodone-acetaminophen (NORCO)  MG per tablet 1 tablet  1 tablet Oral Q4H PRN AlinaJasmin cowart CNM        Hydrocortisone (Perianal) (ANUSOL-HC) 2.5 % rectal cream 1 Application  1 Application Rectal PRN Jasmin Fuller CNM        ibuprofen (ADVIL,MOTRIN) tablet 600 mg  600 mg Oral Q6H PRN Jasmin Fuller CNM   600 mg at 25 0812    [COMPLETED] lactated ringers bolus 1,000 mL  1,000 mL Intravenous Once Lexie Phan MD 4,000 mL/hr at 25 2307 1,000 mL at 25 2307    lanolin topical 1 Application  1 Application Topical Q1H PRN Jasmin Fuller CNM   1 Application at 25 0653    magnesium hydroxide (MILK OF MAGNESIA) suspension 10 mL  10 mL Oral Daily PRN Jasmin Fuller CNM        ondansetron (ZOFRAN) injection 4 mg  4 mg Intravenous Q6H PRN Jasmin Fuller CNM        [COMPLETED] oxytocin (PITOCIN) 30 units in 0.9% sodium chloride 500 mL (premix)  999 mL/hr Intravenous Once Lexie Phan  mL/hr at 25 0309 999 mL/hr at 25 0309    Followed by    [] oxytocin (PITOCIN) 30 units in 0.9% sodium chloride 500 mL (premix)  250 mL/hr Intravenous Continuous Lexie Phan MD   Stopped at 25 0429    oxytocin (PITOCIN) 30 units in 0.9% sodium chloride 500 mL (premix)  125 mL/hr Intravenous Once PRN Jasmin Fuller CNM        [COMPLETED] penicillin G potassium 5 Million Units in sodium chloride 0.9 % 100 mL MBP  5 Million Units Intravenous Once Lexie Phan MD   5 Million Units at 25 6259    prenatal vitamin tablet 1 tablet  1 tablet  Oral Daily Jasmin Fuller CNM   1 tablet at 02/14/25 0754    sodium chloride 0.9 % flush 1-10 mL  1-10 mL Intravenous PRN Jasmin Fuller CNM        witch hazel-glycerin (TUCKS) pad   Topical PRN Jasmin Fuller CNM   Given at 02/14/25 0653     Orders (last 24 hrs)        Start     Ordered    02/15/25 0800  Sitz Bath  3 Times Daily        Comments: PRN    02/14/25 0619    02/15/25 0600  CBC & Differential  Timed        Comments: Postpartum Day 1      02/14/25 0619    02/15/25 0000  bisacodyl (DULCOLAX) suppository 10 mg  Daily PRN         02/14/25 0619    02/14/25 0900  docusate sodium (COLACE) capsule 100 mg  2 Times Daily         02/14/25 0619    02/14/25 0900  prenatal vitamin tablet 1 tablet  Daily         02/14/25 0619    02/14/25 0620  Transfer Patient  Once         02/14/25 0619    02/14/25 0620  Code Status and Medical Interventions: CPR (Attempt to Resuscitate); Full  Continuous         02/14/25 0619    02/14/25 0620  Vital Signs Per hospital policy  Per Hospital Policy/Protocol         02/14/25 0619    02/14/25 0620  Notify Physician  Until Discontinued         02/14/25 0619    02/14/25 0620  Up Ad Sandi  Until Discontinued         02/14/25 0619    02/14/25 0620  Ambulate Patient  Every Shift       02/14/25 0619    02/14/25 0620  Diet: Regular/House; Fluid Consistency: Thin (IDDSI 0)  Diet Effective Now         02/14/25 0619    02/14/25 0620  Advance Diet As Tolerated -Regular  Until Discontinued         02/14/25 0619    02/14/25 0620  Fundal and Lochia Check  Per Hospital Policy/Protocol        Comments: Q 15 min x 4, Q 30 min x 2, then Q Shift    02/14/25 0619    02/14/25 0620  RN to Assess Rh Status & Place RhIG Evaluation Order if Indicated  Continuous         02/14/25 0619    02/14/25 0620  Bladder Assessment  Per Order Details        Comments: Postpartum 1) Upon Admission to Unit & Every 4 Hours PRN Until Voiding. 2) Out of Bed to Void in 8 Hours.    02/14/25 0619    02/14/25 0620  Straight Cath   "Per Order Details        Comments: Postpartum: If Distended & Unable to Void, May Repeat Once.    02/14/25 0619 02/14/25 0620  Indwelling Urinary Catheter  Per Order Details        Comments: Postpartum : After Straight Cathed x2 or if Greater Than 1000mL Residual, Insert Indwelling Urinary Catheter Until Further MD Order.    02/14/25 0619 02/14/25 0620  Breast pump to bed  Once         02/14/25 0619 02/14/25 0620  If indicated -- Please administer RH Immunoglobulin based on results of cord blood evaluation and fetal screen lab tests, pharmacy to dispense  Per Order Details        Comments: See Process Instructions For Reference Range Details.    02/14/25 0619 02/14/25 0620  VTE Prophylaxis Not Indicated: Low Risk; No Risk Factors (0)  Once         02/14/25 0619 02/14/25 0619  sodium chloride 0.9 % flush 1-10 mL  As Needed         02/14/25 0619 02/14/25 0619  oxytocin (PITOCIN) 30 units in 0.9% sodium chloride 500 mL (premix)  Once As Needed         02/14/25 0619 02/14/25 0619  ibuprofen (ADVIL,MOTRIN) tablet 600 mg  Every 6 Hours PRN         02/14/25 0619    02/14/25 0619  acetaminophen (TYLENOL) tablet 650 mg  Every 6 Hours PRN         02/14/25 0619    02/14/25 0619  HYDROcodone-acetaminophen (NORCO) 5-325 MG per tablet 1 tablet  Every 4 Hours PRN        Placed in \"Or\" Linked Group    02/14/25 0619 02/14/25 0619  HYDROcodone-acetaminophen (NORCO)  MG per tablet 1 tablet  Every 4 Hours PRN        Placed in \"Or\" Linked Group    02/14/25 0619    02/14/25 0619  magnesium hydroxide (MILK OF MAGNESIA) suspension 10 mL  Daily PRN         02/14/25 0619    02/14/25 0619  lanolin topical 1 Application  Every 1 Hour PRN         02/14/25 0619    02/14/25 0619  benzocaine-menthol (DERMOPLAST) 20-0.5 % topical spray  As Needed         02/14/25 0619    02/14/25 0619  witch hazel-glycerin (TUCKS) pad  As Needed         02/14/25 0619    02/14/25 0619  Hydrocortisone (Perianal) (ANUSOL-HC) 2.5 % rectal " "cream 1 Application  As Needed         02/14/25 0619    02/14/25 0619  benzocaine (AMERICAINE) 20 % rectal ointment 1 Application  As Needed         02/14/25 0619    02/14/25 0619  ondansetron (ZOFRAN) injection 4 mg  Every 6 Hours PRN         02/14/25 0619    02/14/25 0600  Sol Bulb Cervical Ripening w/ infusion  Once,   Status:  Canceled        Comments: Have laborist place cervical Sol w/ infusion.  If unable to place Sol, start low dose Pitocin drip overnight then increase per protocol @ 5 am.    02/13/25 2240 02/14/25 0430  oxytocin (PITOCIN) 30 units in 0.9% sodium chloride 500 mL (premix)  Continuous        Placed in \"Followed by\" Linked Group    02/14/25 0315 02/14/25 0415  oxytocin (PITOCIN) 30 units in 0.9% sodium chloride 500 mL (premix)  Once        Placed in \"Followed by\" Linked Group    02/14/25 0315 02/14/25 0330  penicillin G in iso-osmotic dextrose IVPB 3 million units (premix)  Every 4 Hours,   Status:  Discontinued        Placed in \"Followed by\" Linked Group    02/13/25 2240 02/14/25 0316  Nurse may remove epidural catheter after delivery.  Until Discontinued,   Status:  Canceled         02/14/25 0315 02/14/25 0316  Transfer to postpartum when discharge criteria met.  Until Discontinued,   Status:  Canceled         02/14/25 0315 02/14/25 0316  Notify Physician (specified)  Until Discontinued,   Status:  Canceled         02/14/25 0315 02/14/25 0316  Vital Signs Per hospital policy  Per Hospital Policy/Protocol,   Status:  Canceled         02/14/25 0315 02/14/25 0316  Fundal & Lochia Check  Per Order Details,   Status:  Canceled        Comments: Every 15 Minutes x4, Then Every 30 Minutes x2, Then Every Shift    02/14/25 0315 02/14/25 0316  Diet: Regular/House; Fluid Consistency: Thin (IDDSI 0)  Diet Effective Now,   Status:  Canceled         02/14/25 0315 02/14/25 0315  Up with Assistance  As Needed,   Status:  Canceled       02/14/25 0315 02/14/25 0315  Fundal " "& Lochia Check  Every Shift,   Status:  Canceled       02/14/25 0315 02/14/25 0315  Apply Ice to Perineum  As Needed,   Status:  Canceled       02/14/25 0315 02/14/25 0315  Bladder Assessment  As Needed,   Status:  Canceled       02/14/25 0315 02/14/25 0315  acetaminophen (TYLENOL) tablet 650 mg  Every 4 Hours PRN,   Status:  Discontinued         02/14/25 0315 02/14/25 0315  ibuprofen (ADVIL,MOTRIN) tablet 600 mg  Every 6 Hours PRN,   Status:  Discontinued         02/14/25 0315 02/14/25 0315  oxyCODONE-acetaminophen (PERCOCET) 5-325 MG per tablet 1 tablet  Every 4 Hours PRN,   Status:  Discontinued         02/14/25 0315 02/14/25 0315  oxyCODONE-acetaminophen (PERCOCET)  MG per tablet 2 tablet  Every 4 Hours PRN,   Status:  Discontinued         02/14/25 0315 02/14/25 0315  methylergonovine (METHERGINE) injection 200 mcg  Once As Needed,   Status:  Discontinued         02/14/25 0315 02/14/25 0315  carboprost (HEMABATE) injection 250 mcg  As Needed,   Status:  Discontinued         02/14/25 0315 02/14/25 0315  miSOPROStol (CYTOTEC) tablet 800 mcg  As Needed,   Status:  Discontinued         02/14/25 0315 02/14/25 0315  ondansetron ODT (ZOFRAN-ODT) disintegrating tablet 4 mg  Every 6 Hours PRN,   Status:  Discontinued        Placed in \"Or\" Linked Group    02/14/25 0315 02/14/25 0315  ondansetron (ZOFRAN) injection 4 mg  Every 6 Hours PRN,   Status:  Discontinued        Placed in \"Or\" Linked Group    02/14/25 0315 02/14/25 0049  POC Glucose Once  PROCEDURE ONCE        Comments: Complete no more than 45 minutes prior to patient eating      02/14/25 0047    02/14/25 0000  Vital Signs q 4 while awake  Every 4 Hours,   Status:  Canceled      Comments: While the patient is awake.    02/13/25 2240 02/14/25 0000  POC Glucose Q4H  Every 4 Hours,   Status:  Canceled      Comments: If patient is gestational diabetic      02/13/25 2240    02/14/25 0000  ropivacaine (NAROPIN) 0.2 % " "injection  Continuous,   Status:  Discontinued         02/13/25 2311 02/14/25 0000  Sod Citrate-Citric Acid (BICITRA) oral solution 30 mL  Once,   Status:  Discontinued         02/13/25 2311 02/14/25 0000  Group B Streptococcus Culture - Swab, Vaginal/Rectum        Comments: This is an external result entered through the Results Console.      02/14/25 0108    02/13/25 2330  sodium chloride 0.9 % flush 10 mL  Every 12 Hours Scheduled,   Status:  Discontinued         02/13/25 2240 02/13/25 2330  lactated ringers bolus 1,000 mL  Once         02/13/25 2240 02/13/25 2330  lactated ringers infusion  Continuous,   Status:  Discontinued         02/13/25 2240 02/13/25 2330  mineral oil liquid 30 mL  Once,   Status:  Discontinued         02/13/25 2240 02/13/25 2330  penicillin G potassium 5 Million Units in sodium chloride 0.9 % 100 mL MBP  Once        Placed in \"Followed by\" Linked Group    02/13/25 2240 02/13/25 2330  oxytocin (PITOCIN) 30 units in 0.9% sodium chloride 500 mL (premix)  Titrated,   Status:  Discontinued         02/13/25 2240 02/13/25 2311  Vital Signs Per Anesthesia Guidelines  Per Order Details,   Status:  Canceled        Comments: Every 3 Minutes x20 Minutes Following Epidural Dosing, Then Every 15 Minutes If Stable    02/13/25 2310 02/13/25 2311  Start IV with #16 or #18 gauge angiocath.  Once,   Status:  Canceled         02/13/25 2310 02/13/25 2311  Nurse or anesthesiologist to remain with patient for 15 minutes following dosing.  Until Discontinued,   Status:  Canceled         02/13/25 2310 02/13/25 2311  Facilitate maternal postion on side and maintain uterine displacement.  Until Discontinued,   Status:  Canceled         02/13/25 2310 02/13/25 2311  Consult anesthesia services prior to changing epidural infusion/rate.  Until Discontinued,   Status:  Canceled         02/13/25 2310 02/13/25 2310  lactated ringers bolus 1,000 mL  As Needed,   Status:  Discontinued   "       02/13/25 2310 02/13/25 2310  ePHEDrine Sulfate (Pressors) 5 MG/ML injection 10 mg  Every 10 Minutes PRN,   Status:  Discontinued         02/13/25 2311 02/13/25 2310  metoclopramide (REGLAN) injection 10 mg  Once As Needed,   Status:  Discontinued         02/13/25 2311 02/13/25 2310  ondansetron (ZOFRAN) injection 4 mg  Once As Needed,   Status:  Discontinued         02/13/25 2311 02/13/25 2310  famotidine (PEPCID) injection 20 mg  Once As Needed         02/13/25 2311 02/13/25 2310  diphenhydrAMINE (BENADRYL) injection 12.5 mg  Every 8 Hours PRN,   Status:  Discontinued         02/13/25 2311 02/13/25 2241  Admit To Obstetrics Inpatient  Once         02/13/25 2240 02/13/25 2241  Code Status and Medical Interventions: CPR (Attempt to Resuscitate); Full Support  Continuous,   Status:  Canceled         02/13/25 2240 02/13/25 2241  Obtain informed consent  Once         02/13/25 2240 02/13/25 2241  Vital Signs Per hospital policy  Per Hospital Policy/Protocol,   Status:  Canceled         02/13/25 2240 02/13/25 2241  Continuous Fetal Monitoring With NST on Admission and Prior to Initiation of Oxytocin.  Per Order Details,   Status:  Canceled        Comments: Continuous Fetal Monitoring With NST on Admission & Prior to Initiation of Oxytocin.    02/13/25 2240 02/13/25 2241  External Uterine Contraction Monitoring  Per Hospital Policy/Protocol,   Status:  Canceled         02/13/25 2240 02/13/25 2241  Notify Physician (specified)  Until Discontinued,   Status:  Canceled         02/13/25 2240 02/13/25 2241  Notify physician for tachysystole (per hospital algorithm)  Until Discontinued,   Status:  Canceled         02/13/25 2240 02/13/25 2241  Notify physician if membranes ruptured, bleeding greater than 1 pad an hour, fetal heart tone abnormality, and severe pain  Until Discontinued,   Status:  Canceled         02/13/25 2240 02/13/25 2241  Up Ad Sandi  Until Discontinued,    Status:  Canceled         25  Cervical Exam  Once,   Status:  Canceled        Comments: Unless contraindicated, every 1-2 hours in active labor, or at nurse's discretion.    25  Initiate Group Beta Strep (GBS) Prophylaxis Protocol, If Criteria Met  Continuous,   Status:  Canceled        Comments: NO TREATMENT RECOMMENDED IF: 1)  Maternal GBS status known negative 2)  Scheduled  birth with intact membranes, not in labor.  3 ) Maternal GBS unknown, no risk factors.   TREAT WITH ANTIBIOTICS IF:  1)  Maternal GBS status is known postive.  2)  Maternal GBS status unknown with these risk factors:  a)  Previous infant affected by GBS infection.  b)  GBS urinary tract infection (UTI) or bacteruria during pregnancy  c)  Unexplained maternal fever in labor (greater than or equal to 100.4F or 38.0C)  d)  Prolonged rupture of the membranes greater than or equal to 18 hours.  e)  Gestational age less than 37 weeks.    25  Bedside ultrasound for fetal presentation  Once,   Status:  Canceled         25  NPO Diet NPO Type: Ice Chips  Diet Effective Now,   Status:  Canceled         25  CBC (No Diff)  Once         25  Treponema pallidum AB w/Reflex RPR  Once         25  Type & Screen  Once         25  Insert Peripheral IV  Once         25  Saline Lock & Maintain IV Access  Continuous,   Status:  Canceled         25  Place Sequential Compression Device  Once,   Status:  Canceled         25  Maintain Sequential Compression Device  Continuous,   Status:  Canceled         25  Inpatient Anesthesiology Consult  Once,   Status:  Canceled        Specialty:  Anesthesiology  Provider:  (Not yet assigned)  "   02/13/25 2240 02/13/25 2240  Position change  As Needed,   Status:  Canceled      Comments: For intra-uterine resuscitation for hypertonus, hypertstimulation, or non-reassuring fetal status    02/13/25 2240 02/13/25 2240  sodium chloride 0.9 % flush 10 mL  As Needed,   Status:  Discontinued         02/13/25 2240 02/13/25 2240  sodium chloride 0.9 % infusion 40 mL  As Needed,   Status:  Discontinued         02/13/25 2240 02/13/25 2240  lidocaine PF 1% (XYLOCAINE) injection 0.5 mL  Once As Needed,   Status:  Discontinued         02/13/25 2240 02/13/25 2240  acetaminophen (TYLENOL) tablet 650 mg  Every 4 Hours PRN,   Status:  Discontinued         02/13/25 2240 02/13/25 2240  butorphanol (STADOL) injection 1 mg  Every 2 Hours PRN,   Status:  Discontinued         02/13/25 2240 02/13/25 2240  butorphanol (STADOL) injection 2 mg  Every 2 Hours PRN,   Status:  Discontinued         02/13/25 2240 02/13/25 2240  ondansetron ODT (ZOFRAN-ODT) disintegrating tablet 4 mg  Every 6 Hours PRN,   Status:  Discontinued        Placed in \"Or\" Linked Group    02/13/25 2240 02/13/25 2240  ondansetron (ZOFRAN) injection 4 mg  Every 6 Hours PRN,   Status:  Discontinued        Placed in \"Or\" Linked Group    02/13/25 2240 02/13/25 2240  terbutaline (BRETHINE) injection 0.25 mg  As Needed,   Status:  Discontinued         02/13/25 2240    Unscheduled  Apply Ice to Perineum  As Needed      Comments: For 20 min q 2 hrs    02/14/25 0619    Unscheduled  Waffle Cushion  As Needed      Comments: For perineal discomfort    02/14/25 0619    Unscheduled  Donut Ring  As Needed      Comments: For perineal pain    02/14/25 0619    Unscheduled  Kpad  As Needed      Comments: For pain    02/14/25 0619    Unscheduled  Warm compress  As Needed       02/14/25 0619    Unscheduled  Apply ace wrap, tight bra, or binder  As Needed       02/14/25 0619    Unscheduled  Apply ice packs  As Needed       02/14/25 0619                   "   Operative/Procedure Notes (last 24 hours)        Jasmin Fuller, CNM at 25 0317           [facility]   Vaginal Delivery Note    Patient Name: Shirley Mills  : 2000  MRN: [id number]    Date of Delivery: 2025    Diagnosis     Pre & Post-Delivery:  Intrauterine pregnancy at 38w1d  Labor status: Spontaneous Onset of Labor    Gestational diabetes, diet controlled     (normal spontaneous vaginal delivery)             Problem List    Transfer to Postpartum     Review the Delivery Report for details.     Delivery     Delivery: Vaginal, Spontaneous    YOB: 2024   Time of Birth:  Gestational Age 3:04 AM  38w1d     Anesthesia: Epidural    Delivering clinician: Jasmin Fuller   Forceps?   No   Vacuum? No    Shoulder dystocia present: No        Delivery narrative:  Patient at 38w1d pushed to deliver a viable male infant over an intact perineum with epidural anesthesia. Infant's head, anterior shoulder, and body delivered atraumatically. Infant was placed on mom's abdomen where he was stimulated to crying. The cord was milked x4, clamped x2 and cut by FOB after a 30-second delay. Infant was taken to the warmer for assessment. Placenta delivered spontaneously and appeared to be intact. Perineum was inspected and found to be intact. EBL 50ml. Mother and infant stable, bonding.         Infant     Findings: male infant     Infant observations: Weight: 3430 g (7 lb 9 oz)  Length: 20 in  Observations/Comments:        Apgars: 6  @ 1 minute /    8  @ 5 minutes   Infant Name: Jaidon     Placenta & Cord         Placenta delivered  Spontaneous at   2025  3:08 AM    Cord: 3 vessels present.   Nuchal Cord?  no   Cord blood obtained: Yes   Cord gases obtained:  No             Repair     Episiotomy: None        Lacerations: No   Estimated Blood Loss:       Quantitative Blood Loss:    QBL from VAG DEL: 50 (25)     Complications     Terminal meconium    Disposition     Mother  to Mother Baby/Postpartum  in stable condition currently.  Baby to remains with mom  in stable condition currently.    Jasmin Fuller CNM  02/14/25  03:25 EST          Electronically signed by Jasmin Fuller CNM at 02/14/25 0327       Physician Progress Notes (last 24 hours)  Notes from 02/13/25 0825 through 02/14/25 0825   No notes of this type exist for this encounter.

## 2025-02-14 NOTE — LACTATION NOTE
02/14/25 0810   Maternal Information   Date of Referral 02/14/25   Person Making Referral lactation consultant  (Courtesy visit for new delivery. Pt states she did not breastfeed her other child. Mother had infant in LC position when I arrived. Latch appeared shallow & infant had dimpling of the L cheek so pt allowed me to reposition her to [facility])   Maternal Reason for Referral no prior breastfeeding experience   Maternal Assessment   Breast Size Issue none   Breast Shape Bilateral:;round   Breast Density Bilateral:;soft   Nipples Bilateral:;everted   Left Nipple Symptoms intact;nontender   Right Nipple Symptoms intact;nontender   Maternal Infant Feeding   Maternal Emotional State receptive;relaxed;independent   Infant Positioning cradle;cross-cradle  (Mother had infant in a modified LC position w/shallow latch. I repositioned to [facility] & infant was able to latch on much deeper & cheek dimpling disappeared.)   Signs of Milk Transfer deep jaw excursions noted   Pain with Feeding no  (initally patient states it was pinching but after repositioning she said it was good.)   Latch Assistance minimal assistance   Support Person Involvement actively supporting mother   Milk Expression/Equipment   Breast Pump Type double electric, personal  (Pt has a Spectra pump at home. Pt encouraged to pump for consistently short or missed feedings.)   Breast Pumping   Breast Pumping Interventions   (Pt encouraged to call outpatient lactation services if she needs any help after discharge.)

## 2025-02-14 NOTE — ANESTHESIA PREPROCEDURE EVALUATION
Anesthesia Evaluation     Patient summary reviewed and Nursing notes reviewed                Airway   Mallampati: II  TM distance: >3 FB  Neck ROM: full  No difficulty expected  Dental      Pulmonary    (+) asthma,  Cardiovascular - negative cardio ROS        Neuro/Psych- negative ROS  GI/Hepatic/Renal/Endo    (+) diabetes mellitus gestational well controlled    Musculoskeletal (-) negative ROS    Abdominal    Substance History - negative use     OB/GYN    (+) Pregnant        Other                    Anesthesia Plan    ASA 2     epidural       Anesthetic plan, risks, benefits, and alternatives have been provided, discussed and informed consent has been obtained with: patient.    Use of blood products discussed with patient .      CODE STATUS:    Level Of Support Discussed With: Patient  Code Status (Patient has no pulse and is not breathing): CPR (Attempt to Resuscitate)  Medical Interventions (Patient has pulse or is breathing): Full Support

## 2025-02-14 NOTE — PROGRESS NOTES
2/14/2025  PPD #0    Subjective   Shirley feels well.  Patient describes her lochia less than menses.  Pain is well controlled.       Objective   Temp: Temp:  [97.7 °F (36.5 °C)-98.4 °F (36.9 °C)] 98.4 °F (36.9 °C) Temp src: Oral   BP: BP: (107-170)/(54-81) 135/74        Pulse: Heart Rate:  [] 94  RR: Resp:  [16-22] 16    General:  No acute distress   Abdomen: Fundus firm and beneath umbilicus   Pelvis: deferred     Lab Results   Component Value Date    WBC 11.90 (H) 02/13/2025    HGB 12.2 02/13/2025    HCT 37.9 02/13/2025    MCV 76.3 (L) 02/13/2025     02/13/2025    URICACID 3.1 08/12/2024    AST 19 08/12/2024    ALT 17 08/12/2024    ABORH A Positive 07/31/2024    HEPBSAG Non-Reactive 08/12/2024       Assessment  PPD# 0 after vaginal delivery    Plan  Routine postpartum care.      This note has been electronically signed.    Lexie Phan MD  08:51 EST  February 14, 2025

## 2025-02-14 NOTE — L&D DELIVERY NOTE
[facility]   Vaginal Delivery Note    Patient Name: Shirley Mills  : 2000  MRN: [id number]    Date of Delivery: 2025    Diagnosis     Pre & Post-Delivery:  Intrauterine pregnancy at 38w1d  Labor status: Spontaneous Onset of Labor    Gestational diabetes, diet controlled     (normal spontaneous vaginal delivery)             Problem List    Transfer to Postpartum     Review the Delivery Report for details.     Delivery     Delivery: Vaginal, Spontaneous    YOB: 2024   Time of Birth:  Gestational Age 3:04 AM  38w1d     Anesthesia: Epidural    Delivering clinician: Jasmin Fuller   Forceps?   No   Vacuum? No    Shoulder dystocia present: No        Delivery narrative:  Patient at 38w1d pushed to deliver a viable male infant over an intact perineum with epidural anesthesia. Infant's head, anterior shoulder, and body delivered atraumatically. Infant was placed on mom's abdomen where he was stimulated to crying. The cord was milked x4, clamped x2 and cut by FOB after a 30-second delay. Infant was taken to the warmer for assessment. Placenta delivered spontaneously and appeared to be intact. Perineum was inspected and found to be intact. EBL 50ml. Mother and infant stable, bonding.         Infant     Findings: male infant     Infant observations: Weight: 3430 g (7 lb 9 oz)  Length: 20 in  Observations/Comments:        Apgars: 6  @ 1 minute /    8  @ 5 minutes   Infant Name: Jaidon     Placenta & Cord         Placenta delivered  Spontaneous at   2025  3:08 AM    Cord: 3 vessels present.   Nuchal Cord?  no   Cord blood obtained: Yes   Cord gases obtained:  No             Repair     Episiotomy: None        Lacerations: No   Estimated Blood Loss:       Quantitative Blood Loss:    QBL from VAG DEL: 50 (25 0311)     Complications     Terminal meconium    Disposition     Mother to Mother Baby/Postpartum  in stable condition currently.  Baby to remains with mom  in stable  condition currently.    Jasmin Fuller, MALU  02/14/25  03:25 EST

## 2025-02-14 NOTE — ANESTHESIA PROCEDURE NOTES
Labor Epidural      Patient reassessed immediately prior to procedure    Patient location during procedure: OB  Performed By  Anesthesiologist: Jeremías Aguilar DO  Preanesthetic Checklist  Completed: patient identified, IV checked, site marked, risks and benefits discussed, surgical consent, monitors and equipment checked, pre-op evaluation and timeout performed  Prep:  Pt Position:sitting  Sterile Tech:gloves, mask, sterile barrier and cap  Prep:chlorhexidine gluconate and isopropyl alcohol  Monitoring:blood pressure monitoring and continuous pulse oximetry  Epidural Block Procedure:  Approach:midline  Guidance:landmark technique and palpation technique  Location:L3-L4  Needle Type:Tuohy  Needle Gauge:17 G  Loss of Resistance Medium: air  Loss of Resistance: 5cm  Cath Depth at skin:11 cm  Paresthesia: none  Aspiration:negative  Test Dose:negative  Number of Attempts: 1  Post Assessment:  Dressing:secured with tape and occlusive dressing applied (Tegaderm Placed)  Pt Tolerance:patient tolerated the procedure well with no apparent complications  Complications:no

## 2025-02-14 NOTE — ANESTHESIA POSTPROCEDURE EVALUATION
Patient: Shirley Mills    Procedure Summary       Date: 02/13/25 Room / Location:     Anesthesia Start: 2321 Anesthesia Stop: 02/14/25 0308    Procedure: LABOR ANALGESIA Diagnosis:     Scheduled Providers:  Provider: Jeremías Aguilar DO    Anesthesia Type: epidural ASA Status: 2            Anesthesia Type: epidural    Vitals  Vitals Value Taken Time   /74 02/14/25 0645   Temp 98.4 °F (36.9 °C) 02/14/25 0645   Pulse 94 02/14/25 0645   Resp 16 02/14/25 0645   SpO2             Post Anesthesia Care and Evaluation    Patient location during evaluation: bedside  Patient participation: complete - patient participated  Level of consciousness: awake and awake and alert  Pain score: 0  Pain management: satisfactory to patient    Airway patency: patent  Anesthetic complications: No anesthetic complications  PONV Status: none  Cardiovascular status: acceptable, hemodynamically stable and stable  Respiratory status: acceptable  Hydration status: stable  Post Neuraxial Block status: Motor and sensory function returned to baseline and No signs or symptoms of PDPH

## 2025-02-14 NOTE — H&P
"27[facility]  Obstetric History and Physical    Chief Complaint   Patient presents with    Laboring       Subjective     Patient is a 24 y.o. female  currently at 38w0d, who presents for term labor  without ROM. She voices good fetal movement. She denies vaginal bleeding and leaking of fluid. She plans epidural for labor and birth.     Her prenatal care is complicated by  GBS + and A1DM.  Her previous obstetric/gynecological history remarkable for preeclampsia.    The following portions of the patients history were reviewed and updated as appropriate: current medications, allergies, past medical history, past surgical history, past family history, past social history, and problem list .       Prenatal Information:   Maternal Prenatal Labs  Blood Type No results found for: \"ABO\"   Rh Status No results found for: \"RH\"   Antibody Screen No results found for: \"ABSCRN\"   Gonnorhea No results found for: \"GCCX\"   Chlamydia No results found for: \"CLAMYDCU\"   RPR No results found for: \"RPR\"   Syphilis Antibody No results found for: \"SYPHILIS\"   Rubella No results found for: \"RUBELLAIGGIN\"   Hepatitis B Surface Antigen No results found for: \"HEPBSAG\"   HIV-1 Antibody No results found for: \"LABHIV1\"   Hepatitis C Antibody No results found for: \"HEPCAB\"   Rapid [place] Drug Screen No results found for: \"AMPMETHU\", \"BARBITSCNUR\", \"LABBENZSCN\", \"LABMETHSCN\", \"LABOPIASCN\", \"THCURSCR\", \"COCAINEUR\", \"AMPHETSCREEN\", \"PROPOXSCN\", \"BUPRENORSCNU\", \"METAMPSCNUR\", \"OXYCODONESCN\", \"TRICYCLICSCN\"   Group B Strep Culture Positive                 Past OB History:       OB History    Para Term  AB Living   2 1 0 1 0 1   SAB IAB Ectopic Molar Multiple Live Births   0 0 0 0 0 1      # Outcome Date GA Lbr Abner/2nd Weight Sex Type Anes PTL Lv   2 Current            1  04/15/21 36w2d  2990 g (6 lb 9.5 oz) F Vag-Spont EPI Y GIULIA      Name: YULIA FOX      Apgar1: 3  Apgar5: 6       Past Medical History: Past Medical " History:   Diagnosis Date    Asthma     Gestational diabetes     A1DM diet controlled    Preeclampsia     G1    Seasonal allergies     Urinary tract infection       Past Surgical History History reviewed. No pertinent surgical history.   Family History: Family History   Problem Relation Age of Onset    No Known Problems Mother     No Known Problems Father       Social History:  reports that she has never smoked. She has never used smokeless tobacco.   reports no history of alcohol use.   reports no history of drug use.        REVIEW OF SYSTEMS             Reports fetal movement is normal             Denies leakage of amniotic fluid.             Denies vaginal bleeding             She reports Regular contractions, frequency: Every 3-4 minutes, intensity strong             All other systems reviewed and are negative    Objective       Vital Signs Range for the last 24 hours  Temperature:     Temp Source:     BP:     Pulse:     Respirations:     SPO2:     O2 Amount (l/min):     O2 Devices     Weight: Weight:  [89.8 kg (198 lb)] 89.8 kg (198 lb)       Presentation: vertex   Cervix: Exam by:  MAYANK   Dilation:  6   Effacement: Cervical Effacement: 80   Station:  -1       Fetal Heart Rate Assessment   Method:  external   Beats/min:     Baseline:  140   Varibility:  moderate   Accels:  present   Decels:  absent   Tracing Category:  1    NST: REACTIVE     Uterine Assessment   Method: Method: palpation   Frequency (min):     Ctx Count in 10 min:     Duration:     Intensity: Contraction Intensity: strong by palpation   Intensity by IUPC:     Resting Tone: Uterine Resting Tone: soft by palpation   Resting Tone by IUPC:     [place] Units:             Constitutional:  Well developed, well nourished, no acute distress, well-groomed.   Respiratory:  Resp e/e/u, normal breath sounds.   Cardiovascular:  Normal rate and rhythm, no murmurs.   Gastrointestinal:  Soft, gravid, nontender.  Uterus: Soft, nontender. Fundus appropriate for  dates.  Neurologic:  Alert & oriented x 3,  no focal deficits noted.   Psychiatric:  Speech and behavior appropriate.   Extremities: no cyanosis, clubbing or edema, no evidence of DVT.        Labs:  Lab Results   Component Value Date    WBC 11.90 (H) 02/13/2025    HGB 12.2 02/13/2025    HCT 37.9 02/13/2025    MCV 76.3 (L) 02/13/2025     02/13/2025    POCGLU 123 12/20/2024    CREATININE 0.51 (L) 08/12/2024    URICACID 3.1 08/12/2024    AST 19 08/12/2024    ALT 17 08/12/2024     (H) 08/12/2024               Assessment & Plan       Gestational diabetes, diet controlled        Assessment:  1.  Intrauterine pregnancy at 38w0d weeks gestation with reactive fetal status.    2.   term labor  without ROM  3.  Obstetrical history is non-contributory.  4.  GBS status: Positive    Plan:  1.Expectant management and Antibiotics for GBS prophylaxis  2. Plan of care has been reviewed with patient and questions answered.  3.  Risks, benefits of treatment plan have been discussed.  4.  All questions have been answered.        Jasmin Fuller CNM  2/13/2025  23:22 EST

## 2025-02-15 LAB
BASOPHILS # BLD AUTO: 0.05 10*3/MM3 (ref 0–0.2)
BASOPHILS NFR BLD AUTO: 0.6 % (ref 0–1.5)
DEPRECATED RDW RBC AUTO: 42.5 FL (ref 37–54)
EOSINOPHIL # BLD AUTO: 0.23 10*3/MM3 (ref 0–0.4)
EOSINOPHIL NFR BLD AUTO: 2.6 % (ref 0.3–6.2)
ERYTHROCYTE [DISTWIDTH] IN BLOOD BY AUTOMATED COUNT: 14.5 % (ref 12.3–15.4)
HCT VFR BLD AUTO: 28.1 % (ref 34–46.6)
HGB BLD-MCNC: 8.5 G/DL (ref 12–15.9)
IMM GRANULOCYTES # BLD AUTO: 0.03 10*3/MM3 (ref 0–0.05)
IMM GRANULOCYTES NFR BLD AUTO: 0.3 % (ref 0–0.5)
LYMPHOCYTES # BLD AUTO: 1.77 10*3/MM3 (ref 0.7–3.1)
LYMPHOCYTES NFR BLD AUTO: 20.4 % (ref 19.6–45.3)
MCH RBC QN AUTO: 24.4 PG (ref 26.6–33)
MCHC RBC AUTO-ENTMCNC: 30.2 G/DL (ref 31.5–35.7)
MCV RBC AUTO: 80.7 FL (ref 79–97)
MONOCYTES # BLD AUTO: 0.47 10*3/MM3 (ref 0.1–0.9)
MONOCYTES NFR BLD AUTO: 5.4 % (ref 5–12)
NEUTROPHILS NFR BLD AUTO: 6.14 10*3/MM3 (ref 1.7–7)
NEUTROPHILS NFR BLD AUTO: 70.7 % (ref 42.7–76)
NRBC BLD AUTO-RTO: 0 /100 WBC (ref 0–0.2)
PLATELET # BLD AUTO: 233 10*3/MM3 (ref 140–450)
PMV BLD AUTO: 11 FL (ref 6–12)
RBC # BLD AUTO: 3.48 10*6/MM3 (ref 3.77–5.28)
WBC NRBC COR # BLD AUTO: 8.69 10*3/MM3 (ref 3.4–10.8)

## 2025-02-15 PROCEDURE — 85025 COMPLETE CBC W/AUTO DIFF WBC: CPT | Performed by: ADVANCED PRACTICE MIDWIFE

## 2025-02-15 RX ADMIN — DOCUSATE SODIUM 100 MG: 100 CAPSULE, LIQUID FILLED ORAL at 21:18

## 2025-02-15 RX ADMIN — IBUPROFEN 600 MG: 600 TABLET, FILM COATED ORAL at 21:18

## 2025-02-15 RX ADMIN — DOCUSATE SODIUM 100 MG: 100 CAPSULE, LIQUID FILLED ORAL at 08:50

## 2025-02-15 RX ADMIN — PRENATAL VITAMINS-IRON FUMARATE 27 MG IRON-FOLIC ACID 0.8 MG TABLET 1 TABLET: at 08:50

## 2025-02-15 NOTE — LACTATION NOTE
02/15/25 1700   Maternal Information   Date of Referral 02/15/25   Person Making Referral lactation consultant   Maternal Reason for Referral separation from infant   Infant Reason for Referral NICU admission;hypoglycemia  (infant admitted to NICU last night d/t hypoglycemia)   Maternal Assessment   Breast Shape Bilateral:;round   Breast Density Bilateral:;soft   Nipples Bilateral:;everted   Left Nipple Symptoms redness;tender   Right Nipple Symptoms redness;tender   Maternal Infant Feeding   Maternal Emotional State receptive   Support Person Involvement actively supporting mother   Milk Expression/Equipment   Breast Pump Type double electric, personal;double electric, hospital grade  (has been using spectra, RN to set up hospital pump)   Breast Pump Flange Size other (see comments)  (RN to bring 18mm flanges for better fit)   Equipment for Home Use breast pump ordered through insurance   Breast Pumping   Breast Pumping Interventions early pumping promoted;frequent pumping encouraged   Lactation Referrals   Lactation Referrals outpatient lactation program   Outpatient Lactation Program Lactation Follow-up Date/Time as needed     Courtesy follow up visit due to infant being transferred to NICU last night. MOB had been exclusively latching until NICU admit, and now is EP. She reports latching was comfortable but pumping is painful. Reviewed pump settings and flange sizing. She needs smaller flange than we have available for her spectra pump, so encouraged her to switch to symphony while here in the hospital. RN to set up. Encouraged patient to look into flange insert kit to help her find the tightest comfortable fit. NICU educational packet provided and reviewed. Encouraged to call as needs arise.    Patient returned call

## 2025-02-15 NOTE — PLAN OF CARE
Goal Outcome Evaluation:  Plan of Care Reviewed With: patient           Outcome Evaluation: pt doing well. Pt teary eyed at begining of shift due to infant being admitted to NICU. Pt up to NICU x1 this shift. Pt seems to be coping better. Bleeding wnl. Vitals wnl. Pt struggling to use electric pump but was able to hand express breast milk for infant. Lactation consult to be placed to aid in pump. Pain controlled with roxicodone 5 x2. No concerns noted at this time.

## 2025-02-16 VITALS
BODY MASS INDEX: 33.8 KG/M2 | HEIGHT: 64 IN | WEIGHT: 198 LBS | RESPIRATION RATE: 18 BRPM | HEART RATE: 79 BPM | DIASTOLIC BLOOD PRESSURE: 79 MMHG | TEMPERATURE: 98.1 F | SYSTOLIC BLOOD PRESSURE: 132 MMHG

## 2025-02-16 LAB
DEPRECATED RDW RBC AUTO: 42.3 FL (ref 37–54)
ERYTHROCYTE [DISTWIDTH] IN BLOOD BY AUTOMATED COUNT: 14.6 % (ref 12.3–15.4)
HCT VFR BLD AUTO: 30.4 % (ref 34–46.6)
HGB BLD-MCNC: 9.4 G/DL (ref 12–15.9)
MCH RBC QN AUTO: 25 PG (ref 26.6–33)
MCHC RBC AUTO-ENTMCNC: 30.9 G/DL (ref 31.5–35.7)
MCV RBC AUTO: 80.9 FL (ref 79–97)
PLATELET # BLD AUTO: 277 10*3/MM3 (ref 140–450)
PMV BLD AUTO: 10.3 FL (ref 6–12)
RBC # BLD AUTO: 3.76 10*6/MM3 (ref 3.77–5.28)
WBC NRBC COR # BLD AUTO: 7.71 10*3/MM3 (ref 3.4–10.8)

## 2025-02-16 PROCEDURE — 85027 COMPLETE CBC AUTOMATED: CPT | Performed by: ADVANCED PRACTICE MIDWIFE

## 2025-02-16 RX ORDER — PSEUDOEPHEDRINE HCL 30 MG
100 TABLET ORAL 2 TIMES DAILY
Qty: 60 CAPSULE | Refills: 1 | Status: SHIPPED | OUTPATIENT
Start: 2025-02-16

## 2025-02-16 RX ORDER — FERROUS SULFATE 325(65) MG
325 TABLET ORAL
Status: DISCONTINUED | OUTPATIENT
Start: 2025-02-17 | End: 2025-02-16 | Stop reason: HOSPADM

## 2025-02-16 RX ORDER — IBUPROFEN 600 MG/1
600 TABLET, FILM COATED ORAL EVERY 6 HOURS PRN
Qty: 20 TABLET | Refills: 0 | Status: SHIPPED | OUTPATIENT
Start: 2025-02-16

## 2025-02-16 RX ORDER — FERROUS SULFATE 325(65) MG
325 TABLET ORAL
Qty: 30 TABLET | Refills: 0 | Status: SHIPPED | OUTPATIENT
Start: 2025-02-16

## 2025-02-16 RX ADMIN — IBUPROFEN 600 MG: 600 TABLET, FILM COATED ORAL at 08:36

## 2025-02-16 RX ADMIN — DOCUSATE SODIUM 100 MG: 100 CAPSULE, LIQUID FILLED ORAL at 08:36

## 2025-02-16 RX ADMIN — PRENATAL VITAMINS-IRON FUMARATE 27 MG IRON-FOLIC ACID 0.8 MG TABLET 1 TABLET: at 08:36

## 2025-02-16 NOTE — PROGRESS NOTES
2/15/2025  PPD #1    Subjective   Shirley feels well.  Patient describes her lochia as less than menses.  Pain is well controlled  Baby in NICU but doing welll       Objective   Temp: Temp:  [97.5 °F (36.4 °C)-98.7 °F (37.1 °C)] 97.5 °F (36.4 °C) Temp src: Oral   BP: BP: (117-134)/(68-81) 134/79        Pulse: Heart Rate:  [71-79] 79  RR: Resp:  [16-18] 18    General:  No acute distress   Abdomen: Fundus firm and beneath umbilicus   Pelvis: deferred     Lab Results   Component Value Date    WBC 8.69 02/15/2025    HGB 8.5 (L) 02/15/2025    HCT 28.1 (L) 02/15/2025    MCV 80.7 02/15/2025     02/15/2025    POCGLU 87 02/14/2025    CREATININE 0.51 (L) 08/12/2024    URICACID 3.1 08/12/2024    AST 19 08/12/2024    ALT 17 08/12/2024     (H) 08/12/2024    HEPBSAG Non-Reactive 08/12/2024     Results from last 7 days   Lab Units 02/13/25  9971   ABO TYPING  A   RH TYPING  Positive   ANTIBODY SCREEN  Negative       Assessment  PPD# 1 after vaginal delivery  Anemia    Plan  Supportive care, anticipate d/c in am.  Check CBC in am      This note has been electronically signed.    Josee Davison CNM  21:27 EST  February 15, 2025

## 2025-02-16 NOTE — DISCHARGE SUMMARY
[facility]  Vaginal Delivery Discharge Summary      Patient: Shirley Mills      MR#:[id number]  Admission  Diagnosis: term pregnancy  Discharge Diagnosis: Same    Date of Admission: 2/13/2025  Date of Discharge:  2/16/2025    Procedures:  Vaginal, Spontaneous    2/14/2025   3:04 AM     Service:  Obstetrics    Hospital Course:  Patient underwent vaginal delivery and remained in the hospital for 3 days.  During that time she remained afebrile and hemodynamically stable.  On the day of discharge, she was eating, ambulating and voiding without difficulty.      Lab Results   Component Value Date    WBC 7.71 02/16/2025    HGB 9.4 (L) 02/16/2025    HCT 30.4 (L) 02/16/2025    MCV 80.9 02/16/2025     02/16/2025    POCGLU 87 02/14/2025    CREATININE 0.51 (L) 08/12/2024    URICACID 3.1 08/12/2024    AST 19 08/12/2024    ALT 17 08/12/2024     (H) 08/12/2024     Results from last 7 days   Lab Units 02/13/25  2249   ABO TYPING  A   RH TYPING  Positive   ANTIBODY SCREEN  Negative       Discharge Medications     Discharge Medications        New Medications        Instructions Start Date   docusate sodium 100 MG capsule   100 mg, Oral, 2 Times Daily      ibuprofen 600 MG tablet  Commonly known as: ADVIL,MOTRIN   600 mg, Oral, Every 6 Hours PRN             Continue These Medications        Instructions Start Date   ferrous sulfate 325 (65 FE) MG tablet   325 mg, Oral, Daily With Breakfast      Flinstones Gummies Omega-3 DHA chewable tablet   2 each      glucose monitor monitoring kit   Use to monitor BG up to 4 times daily      Lancets misc   1 each, Not Applicable, 4 Times Daily             Stop These Medications      acetaminophen 325 MG tablet  Commonly known as: TYLENOL     glucose blood test strip              Discharge Disposition:  To Home    Discharge Condition:  Stable    Discharge Diet: regular    Activity at Discharge: Pelvic rest    Follow-up Appointments  6 weeks      Babs Jang  MD  02/16/25  15:36 EST

## 2025-02-16 NOTE — PROGRESS NOTES
2/16/2025  PPD #2    Subjective   Shirley feels well.  Patient describes her lochia as less than menses.  Pain is well controlled       Objective   Temp: Temp:  [97.5 °F (36.4 °C)-98.1 °F (36.7 °C)] 98.1 °F (36.7 °C) Temp src: Oral   BP: BP: (126-134)/(75-79) 132/79        Pulse: Heart Rate:  [69-79] 79  RR: Resp:  [16-18] 18    General:  No acute distress   Abdomen: Fundus firm and beneath umbilicus   Pelvis: deferred     Lab Results   Component Value Date    WBC 7.71 02/16/2025    HGB 9.4 (L) 02/16/2025    HCT 30.4 (L) 02/16/2025    MCV 80.9 02/16/2025     02/16/2025    ABORH A Positive 07/31/2024    HEPBSAG Non-Reactive 08/12/2024    AST 19 08/12/2024    ALT 17 08/12/2024    URICACID 3.1 08/12/2024       Assessment  PPD# 2 after vaginal delivery  Blood loss anemia from delivery: asymptomatic. On iron    Plan  Discharge to home  Follow up with [facility]  in 6 weeks  Advised no tampons, intercourse, or tub baths for 6 weeks.       This note has been electronically signed.    Babs Jnag MD  15:30 EST  February 16, 2025

## 2025-02-16 NOTE — PLAN OF CARE
Goal Outcome Evaluation:  Plan of Care Reviewed With: patient        Progress: improving  Outcome Evaluation: VSS, fundus/lochia/perineum WDL, pumping q3h, visiting baby in NICU frequently, pain well controlled with Motrin and Tylenol, ready for D/C home today.

## 2025-02-17 ENCOUNTER — MATERNAL SCREENING (OUTPATIENT)
Dept: CALL CENTER | Facility: HOSPITAL | Age: 25
End: 2025-02-17
Payer: MEDICAID

## 2025-02-17 NOTE — OUTREACH NOTE
Maternal Screening Survey      Flowsheet Row Responses   Eligibility Eligible   Prep survey completed? Yes   Facility patient discharged from? [facility]

## 2025-02-17 NOTE — PAYOR COMM NOTE
"Reji Mills (24 y.o. Female)     From:Jamila José LPN, Utilization Review  Phone #[phone]  Fax #[phone]    Auth#[id number]     Discharged 2/16/25  baby in NICU.          Date of Birth   2000    Social Security Number       Address   [address] [address] [place] [place] [facility] [place] [address]    Home Phone   [phone]    MRN   [id number]       Lutheran   None    Marital Status   Single                            Admission Date   2/13/25    Admission Type   Elective    Admitting Provider   Lexie Phan MD    Attending Provider       Department, Room/Bed   [facility] MOTHER BABY 4B, N439/1       Discharge Date   2/16/2025    Discharge Disposition   Home or Self Care    Discharge Destination                                 Attending Provider: (none)   Allergies: No Known Allergies    Isolation: None   Infection: None   Code Status: Prior    Ht: 162.6 cm (64\")   Wt: 89.8 kg (198 lb)    Admission Cmt: None   Principal Problem: Gestational diabetes, diet controlled [O24.410]                   Active Insurance as of 2/13/2025       Primary Coverage       Payor Plan Insurance Group Employer/Plan Group    [facility] COMMUNITY PLAN [facility] COMMUNITY PLAN [facility]       Payor Plan Address Payor Plan Phone Number Payor Plan Fax Number Effective Dates    [address]   7/1/2024 - None Entered    [facility] [address]         Subscriber Name Subscriber Birth Date Member ID       REJI MILLS 2000 [id number]                     Emergency Contacts        (Rel.) Home Phone Work Phone Mobile Phone    HALEIGH JENKINS (Significant Other) [phone] -- [phone]    MARTHA BATES (Mother) [phone] -- [phone]                 Discharge Summary        aBbs Jang MD at 02/16/25 [facility]          [facility]  Vaginal Delivery Discharge Summary      Patient: Reji Mills      MR#:[id number]  Admission  Diagnosis: term pregnancy  Discharge " Diagnosis: Same    Date of Admission: 2/13/2025  Date of Discharge:  2/16/2025    Procedures:  Vaginal, Spontaneous    2/14/2025   3:04 AM     Service:  Obstetrics    Hospital Course:  Patient underwent vaginal delivery and remained in the hospital for 3 days.  During that time she remained afebrile and hemodynamically stable.  On the day of discharge, she was eating, ambulating and voiding without difficulty.      Lab Results   Component Value Date    WBC 7.71 02/16/2025    HGB 9.4 (L) 02/16/2025    HCT 30.4 (L) 02/16/2025    MCV 80.9 02/16/2025     02/16/2025    POCGLU 87 02/14/2025    CREATININE 0.51 (L) 08/12/2024    URICACID 3.1 08/12/2024    AST 19 08/12/2024    ALT 17 08/12/2024     (H) 08/12/2024     Results from last 7 days   Lab Units 02/13/25  0762   ABO TYPING  A   RH TYPING  Positive   ANTIBODY SCREEN  Negative       Discharge Medications     Discharge Medications        New Medications        Instructions Start Date   docusate sodium 100 MG capsule   100 mg, Oral, 2 Times Daily      ibuprofen 600 MG tablet  Commonly known as: ADVIL,MOTRIN   600 mg, Oral, Every 6 Hours PRN             Continue These Medications        Instructions Start Date   ferrous sulfate 325 (65 FE) MG tablet   325 mg, Oral, Daily With Breakfast      Flinstones Gummies Omega-3 DHA chewable tablet   2 each      glucose monitor monitoring kit   Use to monitor BG up to 4 times daily      Lancets misc   1 each, Not Applicable, 4 Times Daily             Stop These Medications      acetaminophen 325 MG tablet  Commonly known as: TYLENOL     glucose blood test strip              Discharge Disposition:  To Home    Discharge Condition:  Stable    Discharge Diet: regular    Activity at Discharge: Pelvic rest    Follow-up Appointments  6 weeks      Babs Jang MD  02/16/25  15:36 EST       Electronically signed by Babs Jang MD at 02/16/25 0420

## 2025-02-25 ENCOUNTER — MATERNAL SCREENING (OUTPATIENT)
Dept: CALL CENTER | Facility: HOSPITAL | Age: 25
End: 2025-02-25
Payer: MEDICAID

## 2025-02-25 NOTE — OUTREACH NOTE
Maternal Screening Survey      Flowsheet Row Responses   Facility patient discharged from? [facility]   Attempt successful? Yes   Call start time 1150   Call end time 1153   I have been able to laugh and see the funny side of things. 0   I have looked forward with enjoyment to things. 0   I have blamed myself unnecessarily when things went wrong. 0   I have been anxious or worried for no good reason. 0   I have felt scared or panicky for no good reason. 0   Things have been getting on top of me. 0   I have been so unhappy that I have had difficulty sleeping. 0   I have felt sad or miserable. 0   I have been so unhappy that I have been crying. 0   The thought of harming myself has occurred to me. 0   [place]  Depression Scale Total 0   Did any of your parents have problems with alcohol or drug use? No   Do any of your peers have problems with alcohol or drug use? No   Does your partner have problems with alcohol or drug use? No   Before you were pregnant did you have problems with alcohol or drug use? (past) No   In the past month, did you drink beer, wine, liquor or use any other drugs? (pregnancy) No   Maternal Screening call completed Yes   Call end time 1153              Shadia MARQUEZ - Registered Nurse

## 2025-03-17 ENCOUNTER — OFFICE VISIT (OUTPATIENT)
Age: 25
End: 2025-03-17
Payer: MEDICAID

## 2025-03-17 ENCOUNTER — LAB (OUTPATIENT)
Age: 25
End: 2025-03-17
Payer: MEDICAID

## 2025-03-17 VITALS
HEART RATE: 79 BPM | BODY MASS INDEX: 27.84 KG/M2 | OXYGEN SATURATION: 99 % | SYSTOLIC BLOOD PRESSURE: 122 MMHG | DIASTOLIC BLOOD PRESSURE: 80 MMHG | HEIGHT: 64 IN | WEIGHT: 163.1 LBS

## 2025-03-17 DIAGNOSIS — E55.9 VITAMIN D DEFICIENCY: ICD-10-CM

## 2025-03-17 DIAGNOSIS — Z00.00 ANNUAL PHYSICAL EXAM: ICD-10-CM

## 2025-03-17 DIAGNOSIS — Z00.00 ANNUAL PHYSICAL EXAM: Primary | ICD-10-CM

## 2025-03-17 DIAGNOSIS — O24.410 DIET CONTROLLED GESTATIONAL DIABETES MELLITUS (GDM) IN THIRD TRIMESTER: ICD-10-CM

## 2025-03-17 LAB
25(OH)D3 SERPL-MCNC: 14.4 NG/ML (ref 30–100)
DEPRECATED RDW RBC AUTO: 43.8 FL (ref 37–54)
ERYTHROCYTE [DISTWIDTH] IN BLOOD BY AUTOMATED COUNT: 15.2 % (ref 12.3–15.4)
GLUCOSE BLDC GLUCOMTR-MCNC: 87 MG/DL (ref 70–130)
HCT VFR BLD AUTO: 39.9 % (ref 34–46.6)
HGB BLD-MCNC: 12 G/DL (ref 12–15.9)
MCH RBC QN AUTO: 24.1 PG (ref 26.6–33)
MCHC RBC AUTO-ENTMCNC: 30.1 G/DL (ref 31.5–35.7)
MCV RBC AUTO: 80.1 FL (ref 79–97)
PLATELET # BLD AUTO: 296 10*3/MM3 (ref 140–450)
PMV BLD AUTO: 9.7 FL (ref 6–12)
RBC # BLD AUTO: 4.98 10*6/MM3 (ref 3.77–5.28)
WBC NRBC COR # BLD AUTO: 7.67 10*3/MM3 (ref 3.4–10.8)

## 2025-03-17 PROCEDURE — 99395 PREV VISIT EST AGE 18-39: CPT | Performed by: NURSE PRACTITIONER

## 2025-03-17 PROCEDURE — 82306 VITAMIN D 25 HYDROXY: CPT | Performed by: NURSE PRACTITIONER

## 2025-03-17 PROCEDURE — 90471 IMMUNIZATION ADMIN: CPT | Performed by: NURSE PRACTITIONER

## 2025-03-17 PROCEDURE — 36415 COLL VENOUS BLD VENIPUNCTURE: CPT | Performed by: NURSE PRACTITIONER

## 2025-03-17 PROCEDURE — 84443 ASSAY THYROID STIM HORMONE: CPT | Performed by: NURSE PRACTITIONER

## 2025-03-17 PROCEDURE — 85027 COMPLETE CBC AUTOMATED: CPT | Performed by: NURSE PRACTITIONER

## 2025-03-17 PROCEDURE — 80053 COMPREHEN METABOLIC PANEL: CPT | Performed by: NURSE PRACTITIONER

## 2025-03-17 PROCEDURE — 80061 LIPID PANEL: CPT | Performed by: NURSE PRACTITIONER

## 2025-03-17 PROCEDURE — 2014F MENTAL STATUS ASSESS: CPT | Performed by: NURSE PRACTITIONER

## 2025-03-17 PROCEDURE — 90715 TDAP VACCINE 7 YRS/> IM: CPT | Performed by: NURSE PRACTITIONER

## 2025-03-17 PROCEDURE — 82948 REAGENT STRIP/BLOOD GLUCOSE: CPT | Performed by: NURSE PRACTITIONER

## 2025-03-17 RX ORDER — BLOOD-GLUCOSE METER
EACH MISCELLANEOUS
COMMUNITY
Start: 2024-12-20

## 2025-03-17 NOTE — LETTER
[facility]  Vaccine Consent Form    Patient Name:  Shirley Mills  Patient :  2000     Vaccine(s) Ordered    Tdap Vaccine => 6yo IM (BOOSTRIX/ADACEL)        Screening Checklist  The following questions should be completed prior to vaccination. If you answer “yes” to any question, it does not necessarily mean you should not be vaccinated. It just means we may need to clarify or ask more questions. If a question is unclear, please ask your healthcare provider to explain it.    Yes No   Any fever or moderate to severe illness today (mild illness and/or antibiotic treatment are not contraindications)?     Do you have a history of a serious reaction to any previous vaccinations, such as anaphylaxis, encephalopathy within 7 days, Guillain-Mesa syndrome within 6 weeks, seizure?     Have you received any live vaccine(s) (e.g MMR, ROGERIO) or any other vaccines in the last month (to ensure duplicate doses aren't given)?     Do you have an anaphylactic allergy to latex (DTaP, DTaP-IPV, Hep A, Hep B, MenB, RV, Td, Tdap), baker’s yeast (Hep B, HPV), polysorbates (RSV, nirsevimab, PCV 20, Rotavirrus, Tdap, Shingrix), or gelatin (ROGERIO, MMR)?     Do you have an anaphylactic allergy to neomycin (Rabies, ROGERIO, MMR, IPV, Hep A), polymyxin B (IPV), or streptomycin (IPV)?      Any cancer, leukemia, AIDS, or other immune system disorder? (ROGERIO, MMR, RV)     Do you have a parent, brother, or sister with an immune system problem (if immune competence of vaccine recipient clinically verified, can proceed)? (MMR, ROGERIO)     Any recent steroid treatments for >2 weeks, chemotherapy, or radiation treatment? (ROGERIO, MMR)     Have you received antibody-containing blood transfusions or IVIG in the past 11 months (recommended interval is dependent on product)? (MMR, ROGERIO)     Have you taken antiviral drugs (acyclovir, famciclovir, valacyclovir for ROGERIO) in the last 24 or 48 hours, respectively?      Are you pregnant or planning to become  "pregnant within 1 month? (ROGERIO, MMR, HPV, IPV, MenB, Abrexvy; For Hep B- refer to Engerix-B; For RSV - Abrysvo is indicated for 32-36 weeks of pregnancy from September to January)     For infants, have you ever been told your child has had intussusception or a medical emergency involving obstruction of the intestine (Rotavirus)? If not for an infant, can skip this question.         *Ordering Physicians/APC should be consulted if \"yes\" is checked by the patient or guardian above.  I have received, read, and understand the Vaccine Information Statement (VIS) for each vaccine ordered.  I have considered my or my child's health status as well as the health status of my close contacts.  I have taken the opportunity to discuss my vaccine questions with my or my child's health care provider.   I have requested that the ordered vaccine(s) be given to me or my child.  I understand the benefits and risks of the vaccines.  I understand that I should remain in the clinic for 15 minutes after receiving the vaccine(s).  _________________________________________________________  Signature of Patient or Parent/Legal Guardian ____________________  Date     "

## 2025-03-17 NOTE — PROGRESS NOTES
"Chief Complaint  New Patient and Annual Exam    Subjective          Shirley Mills presents to [facility] for   History of Present Illness  History of Present Illness  The patient presents for a physical exam.    She has not had a consultation with a primary care physician since the age of 20. She is currently fasting and has a history of gestational diabetes, which was managed through dietary modifications. She does not recall receiving any vaccinations during her pregnancy. Postpartum, she reports feeling well, with an improvement in her condition compared to her previous pregnancy. She has no history of thyroid-related issues. She is scheduled for a 6-week postpartum follow-up with her obstetrician. Her dietary habits are irregular, often skipping breakfast and alternating between lunch and dinner, with minimal snacking in between. She does not consume beverages at work but admits to drinking coffee, to which she adds cream and sugar. This pattern of eating is not new to her. She has experienced significant weight loss, dropping from 198 pounds at the end of her pregnancy to 163 pounds currently. She reports no gastrointestinal issues such as constipation or diarrhea. She did not require sutures post-delivery. Her baby weighed 7 pounds 9 ounces at birth.    FAMILY HISTORY  A few people in her family have vitamin D deficiency.    IMMUNIZATIONS  She is due for Tdap vaccine.    Objective   Vital Signs:   Vitals:    03/17/25 1144   BP: 122/80   BP Location: Right arm   Patient Position: Sitting   Cuff Size: Adult   Pulse: 79   SpO2: 99%   Weight: 74 kg (163 lb 1.6 oz)   Height: 162.6 cm (64.02\")     Body mass index is 27.98 kg/m².    BMI is >= 25 and <30. (Overweight) The following options were offered after discussion;: none (medical contraindication)    The following portions of the patient's history were reviewed and updated as appropriate: allergies, current medications, past " family history, past medical history, past social history, past surgical history, and problem list.      Physical Exam  Vitals and nursing note reviewed.   Constitutional:       Appearance: Normal appearance.   HENT:      Right Ear: Tympanic membrane, ear canal and external ear normal.      Left Ear: Tympanic membrane, ear canal and external ear normal.      Nose: Nose normal.      Mouth/Throat:      Mouth: Mucous membranes are moist.      Pharynx: Oropharynx is clear.   Eyes:      Conjunctiva/sclera: Conjunctivae normal.      Pupils: Pupils are equal, round, and reactive to light.   Cardiovascular:      Rate and Rhythm: Normal rate and regular rhythm.      Heart sounds: Normal heart sounds.   Pulmonary:      Effort: Pulmonary effort is normal.      Breath sounds: Normal breath sounds.   Abdominal:      General: Bowel sounds are normal.      Palpations: Abdomen is soft.   Musculoskeletal:         General: Normal range of motion.      Cervical back: Normal range of motion and neck supple.   Skin:     General: Skin is warm.   Neurological:      Mental Status: She is alert and oriented to person, place, and time.   Psychiatric:         Mood and Affect: Mood normal.         Behavior: Behavior normal.         Thought Content: Thought content normal.        Result Review :                Immunization History   Administered Date(s) Administered    COVID-19 (PFIZER) Purple Cap Monovalent 11/18/2021    DTaP 2000, 02/01/2002, 03/20/2002    DTaP, Unspecified 10/31/2003, 08/15/2005    FluMist 2-49yrs (Nasal) 09/13/2012    Fluzone (or Fluarix & Flulaval for VFC) >6mos 10/20/2014, 01/29/2016, 12/19/2017, 09/27/2018    HPV Quadrivalent 09/13/2012, 03/13/2013, 10/16/2013, 04/17/2014    HPV, Unspecified 09/13/2012    Hep A, 2 Dose 10/16/2013, 04/17/2014    Hep B, Adolescent or Pediatric 2000, 06/27/2005, 01/09/2006    Hepatitis B Adult/Adolescent IM 06/22/2005, 01/07/2006    HiB 2000, 02/01/2002, 06/07/2002,  08/13/2002    IPV 2000, 02/01/2002, 03/20/2002, 08/15/2005    MCV4 Unspecified 09/13/2012    MMR 08/13/2002, 08/15/2005    Meningococcal B,(Bexsero) 12/19/2017, 09/27/2018    Meningococcal Conjugate 12/19/2017    Meningococcal, Unspecified 09/13/2012    PEDS-Pneumococcal Conjugate (PCV7) 03/20/2002, 06/17/2002, 08/13/2002    Tdap 09/13/2012, 03/17/2025    Varicella 08/13/2002, 12/19/2017     Health Maintenance   Topic Date Due    BMI FOLLOWUP  Never done    ANNUAL PHYSICAL  Never done    PAP SMEAR  Never done    COVID-19 Vaccine (2 - 2024-25 season) 03/19/2025 (Originally 9/1/2024)    INFLUENZA VACCINE  03/31/2025 (Originally 7/1/2024)    TDAP/TD VACCINES (3 - Td or Tdap) 03/17/2035    HEPATITIS C SCREENING  Completed    MENINGOCOCCAL B VACCINE  Completed    HPV VACCINES  Completed    Pneumococcal Vaccine 0-49  Aged Out        Assessment and Plan    Diagnoses and all orders for this visit:    1. Annual physical exam (Primary)  -     Tdap Vaccine => 8yo IM (BOOSTRIX/ADACEL)  -     CBC (No Diff); Future  -     Comprehensive Metabolic Panel; Future  -     Lipid Panel; Future  -     TSH Rfx On Abnormal To Free T4; Future    2. Diet controlled gestational diabetes mellitus (GDM) in third trimester  -     POC Glucose    3. Vitamin D deficiency  -     Vitamin D,25-Hydroxy; Future        Assessment & Plan  1. Physical examination.  Her blood glucose levels are within the normal range at 89. She has experienced a significant weight loss of 35 pounds since her pregnancy, bringing her weight down to 163 pounds, which is below her pre-pregnancy weight of 169 pounds. Her BMI is not a concern at this stage, given her recent delivery. She is scheduled for a 6-week postpartum follow-up with her obstetrician. She is advised to reduce her coffee intake, increase her water consumption, and maintain awareness of her dietary habits throughout the day. She is also encouraged to incorporate small, healthy snacks into her daily  routine. A comprehensive lab workup will be conducted today, including a vitamin D level check. A Tdap vaccine will be administered during this visit. Thyroid function tests will be ordered to rule out any potential abnormalities.    Counseling/anticipatory guidance: Nutrition, physical activity, healthy weight, dental health, mental health, eye exam, immunizations, screenings   - Reviewed immunization records. Discussed routine labs. Appt scheduled with OBGYN.       Follow Up   No follow-ups on file.  Patient was given instructions and counseling regarding her condition or for health maintenance advice. Please see specific information pulled into the AVS if appropriate.     Patient or patient representative verbalized consent for the use of Ambient Listening during the visit with  HOLLY Garcia for chart documentation. 3/17/2025  12:41 EDT

## 2025-03-18 LAB
ALBUMIN SERPL-MCNC: 4.3 G/DL (ref 3.5–5.2)
ALBUMIN/GLOB SERPL: 1.7 G/DL
ALP SERPL-CCNC: 89 U/L (ref 39–117)
ALT SERPL W P-5'-P-CCNC: 22 U/L (ref 1–33)
ANION GAP SERPL CALCULATED.3IONS-SCNC: 10 MMOL/L (ref 5–15)
AST SERPL-CCNC: 19 U/L (ref 1–32)
BILIRUB SERPL-MCNC: 0.2 MG/DL (ref 0–1.2)
BUN SERPL-MCNC: 12 MG/DL (ref 6–20)
BUN/CREAT SERPL: 17.1 (ref 7–25)
CALCIUM SPEC-SCNC: 9.1 MG/DL (ref 8.6–10.5)
CHLORIDE SERPL-SCNC: 106 MMOL/L (ref 98–107)
CHOLEST SERPL-MCNC: 184 MG/DL (ref 0–200)
CO2 SERPL-SCNC: 23 MMOL/L (ref 22–29)
CREAT SERPL-MCNC: 0.7 MG/DL (ref 0.57–1)
EGFRCR SERPLBLD CKD-EPI 2021: 124.8 ML/MIN/1.73
GLOBULIN UR ELPH-MCNC: 2.5 GM/DL
GLUCOSE SERPL-MCNC: 79 MG/DL (ref 65–99)
HDLC SERPL-MCNC: 49 MG/DL (ref 40–60)
LDLC SERPL CALC-MCNC: 115 MG/DL (ref 0–100)
LDLC/HDLC SERPL: 2.31 {RATIO}
POTASSIUM SERPL-SCNC: 4.5 MMOL/L (ref 3.5–5.2)
PROT SERPL-MCNC: 6.8 G/DL (ref 6–8.5)
SODIUM SERPL-SCNC: 139 MMOL/L (ref 136–145)
TRIGL SERPL-MCNC: 108 MG/DL (ref 0–150)
TSH SERPL DL<=0.05 MIU/L-ACNC: 1.43 UIU/ML (ref 0.27–4.2)
VLDLC SERPL-MCNC: 20 MG/DL (ref 5–40)

## 2025-03-27 ENCOUNTER — PATIENT ROUNDING (BHMG ONLY) (OUTPATIENT)
Age: 25
End: 2025-03-27
Payer: MEDICAID

## 2025-06-27 DIAGNOSIS — E55.9 VITAMIN D DEFICIENCY: ICD-10-CM

## 2025-06-30 RX ORDER — ERGOCALCIFEROL 1.25 MG/1
CAPSULE, LIQUID FILLED ORAL
Qty: 12 CAPSULE | Refills: 0 | OUTPATIENT
Start: 2025-06-30

## 2025-06-30 NOTE — TELEPHONE ENCOUNTER
Rx Refill Note  Requested Prescriptions     Pending Prescriptions Disp Refills    vitamin D (ERGOCALCIFEROL) 1.25 MG (68884 UT) capsule capsule [Pharmacy Med Name: VITAMIN D2 50,000IU (ERGO) CAP RX] 12 capsule 0     Sig: TAKE 1 CAPSULE BY MOUTH 1 TIME EVERY WEEK FOR 12 DOSES      Last office visit with prescribing clinician: 3/17/2025   Last telemedicine visit with prescribing clinician: Visit date not found   Next office visit with prescribing clinician: Visit date not found     Lexus Thompson MA  06/30/25, 11:58 EDT